# Patient Record
Sex: FEMALE | Race: WHITE | NOT HISPANIC OR LATINO | Employment: OTHER | URBAN - METROPOLITAN AREA
[De-identification: names, ages, dates, MRNs, and addresses within clinical notes are randomized per-mention and may not be internally consistent; named-entity substitution may affect disease eponyms.]

---

## 2017-01-11 RX ORDER — METOPROLOL TARTRATE 50 MG/1
TABLET, FILM COATED ORAL
Qty: 180 TABLET | Refills: 1 | Status: SHIPPED | OUTPATIENT
Start: 2017-01-11 | End: 2017-07-07 | Stop reason: SDUPTHER

## 2017-01-25 ENCOUNTER — OFFICE VISIT (OUTPATIENT)
Dept: CARDIOLOGY | Facility: CLINIC | Age: 77
End: 2017-01-25

## 2017-01-25 VITALS
HEIGHT: 67 IN | HEART RATE: 72 BPM | BODY MASS INDEX: 24.01 KG/M2 | SYSTOLIC BLOOD PRESSURE: 142 MMHG | RESPIRATION RATE: 20 BRPM | DIASTOLIC BLOOD PRESSURE: 74 MMHG | WEIGHT: 153 LBS

## 2017-01-25 DIAGNOSIS — Z98.890 STATUS POST TRICUSPID VALVE REPAIR: ICD-10-CM

## 2017-01-25 DIAGNOSIS — Z98.890 HISTORY OF REPAIR OF MITRAL VALVE: ICD-10-CM

## 2017-01-25 DIAGNOSIS — I25.10 CORONARY ARTERY DISEASE INVOLVING NATIVE CORONARY ARTERY OF NATIVE HEART WITHOUT ANGINA PECTORIS: Primary | ICD-10-CM

## 2017-01-25 PROCEDURE — 99213 OFFICE O/P EST LOW 20 MIN: CPT | Performed by: INTERNAL MEDICINE

## 2017-01-25 RX ORDER — ASPIRIN 325 MG
325 TABLET ORAL DAILY
COMMUNITY

## 2017-01-25 NOTE — MR AVS SNAPSHOT
Yun Han   2017 11:40 AM   Office Visit    Dept Phone:  [phone]   Encounter #:  [id number]    Provider:  Jerzy Burleson MD   Department:  [facility] CARDIOLOGY                Your Full Care Plan              Today's Medication Changes          These changes are accurate as of: 17 12:30 PM.  If you have any questions, ask your nurse or doctor.               Medication(s)that have changed:     aspirin 325 MG tablet   Take 325 mg by mouth Daily.   What changed:  Another medication with the same name was removed. Continue taking this medication, and follow the directions you see here.   Changed by:  Jerzy Burleson MD         Stop taking medication(s)listed here:     PRILOSEC 20 MG capsule   Generic drug:  omeprazole   Stopped by:  Jerzy Burleson MD                      Your Updated Medication List          This list is accurate as of: 17 12:30 PM.  Always use your most recent med list.                aspirin 325 MG tablet       metoprolol tartrate 50 MG tablet   Commonly known as:  LOPRESSOR   TAKE ONE TABLET BY MOUTH TWICE A DAY       Multiple Vitamin tablet       Omega 3 1000 MG capsule       vitamin B-1 50 MG tablet               Instructions     None    Patient Instructions History      Upcoming Appointments     Visit Type Date Time Department    FOLLOW UP 2017 11:40 AM [facility]    FOLLOW UP 2017 11:40 AM [facility]      [employer] Signup     [facility] [employer] allows you to send messages to your doctor, view your test results, renew your prescriptions, schedule appointments, and more. To sign up, go to [employer] and click on the Sign Up Now link in the New User? box. Enter your [employer] Activation Code exactly as it appears below along with the last four digits of your Social Security Number and your Date of Birth () to complete the sign-up process. If you do not sign up  "before the expiration date, you must request a new code.    MyChart Activation Code: [email or web address]  Expires: 2/8/2017 12:30 PM    If you have questions, you can email [email or web address] or call [phone] to talk to our MyChart staff. Remember, MyChart is NOT to be used for urgent needs. For medical emergencies, dial 911.               Other Info from Your Visit           Your Appointments     Jul 24, 2017 11:40 AM EDT   Follow Up with Jerzy Burleson MD   [facility] CARDIOLOGY (--)    [address]  [facility] [address] [phone]           Arrive 15 minutes prior to appointment.              Allergies     Atorvastatin      Codeine      Rosuvastatin  Myalgia    Sulfa Antibiotics        Reason for Visit     Coronary Artery Disease           Vital Signs     Blood Pressure Pulse Respirations Height Weight Body Mass Index    142/74 72 20 67\" (170.2 cm) 153 lb (69.4 kg) 23.96 kg/m2    Smoking Status                   Never Smoker             "

## 2017-01-26 NOTE — PROGRESS NOTES
Date of Office Visit: 2017  Encounter Provider: Jerzy Burleson MD  [facility] of Service[facility]  Patient Name: Yun Han  :1940    Chief complaint: Follow-up for coronary artery disease, mitral valve repair, tricuspid  valve repair, atrial flutter s/p ablation, and atrial tachycardia s/p ablation.     History of Present Illness:    Dear Dr. Elizabeth:      I again had the pleasure of seeing your patient in cardiology office on 2017. As  you well know, she is a very pleasant 76 year-old white female with a past history  significant for coronary artery disease status post coronary artery bypass grafting,  valvular heart disease status post mitral valve and tricuspid valve repairs, and PFO  closure who presents for followup. The patient was originally admitted on 2011 with  a TIA. At that time, she was experiencing left arm paraesthesias, as well as facial  numbness. As part of her workup, she underwent a transesophageal echocardiogram on  2011. 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 There was  also significant valvular heart disease with severe mitral regurgitation and  mild-to-moderate tricuspid regurgitation. She followed up on this in the office for the  mitral regurgitation, and an exercise treadmill stress test was obtained on 2011 to  assess her functional capacity. She did manage to exercise for 5 minutes and 35 seconds,  although she developed severe diffuse ST-T wave changes during the test, diagnostic for  ischemia. She then underwent a right and left heart catheterization on 2011 by Dr. Steve Black. She did have significant coronary artery disease in the LAD and left  circumflex systems. She then underwent a mitral valve repair, tricuspid valve repair, PFO   closure, and three vessel coronary artery bypass grafting operation on 2011 by Dr. Montana Saini at [facility]. She did  develop some postoperative atrial fibrillation,   although this converted with IV amiodarone.      The patient developed tachycardia at one of her rehab sessions on 02/29/2012 at [facility]. Two subsequent EKGs determined this to be atrial flutter. The patient  converted back to normal sinus rhythm on her own. She then was placed back on amio  as well as the Pradaxa. She had a recurrence of atrial flutter on 08/31/2012 and was  admitted at that time. The patient underwent a cavotricuspid isthmus ablation by Dr. Beto Lea at [facility] on 11/08/2012. However, she again presented to her clinical  appointment on 11/26/2012 with recurrent tachycardia into the 120s. She subsequently  underwent a repeat electrophysiology study on 11/27/2012, which showed an ectopic right  atrial tachycardia, which was successfully ablated as well.      The patient presents today for follow-up.  She is doing very well.  She has had no angina   or chest pain.  She has not had any significant shortness of breath.  She has no new  complaints today.    Past Medical History   Diagnosis Date   • Allergic rhinitis    • Atherosclerosis of aorta      Severe plaques in the descending aorta and aortic arch by ROSINA on 10/4/12   • Atrial fibrillation      Post-op after CABG in 12/11    • Atrial flutter      s/p cavotricuspid isthmus ablation on 11/8/12 by Dr. Beto Lea at [facility].     • Bifascicular bundle branch block      RBBB and left posterior hemiblock    • Carotid artery plaque      CTA of head and neck in 9/11 showed moderate calcification at the right carotid bulb causing less than 50% narrowing    • Coronary artery disease      s/p CABG    • Depression with anxiety    • History of atrial flutter      s/p cavotricuspid isthmus ablation on 11/8/12 by Dr Lea at [facility]     • Hypertension    • Mitral valve insufficiency      Moderate residual MR through the repair by ROSINA on 10/4/12. Mild MR by echo on 2/6/14 and  6/30/16.   • PAT (paroxysmal atrial tachycardia)      Ectopic right atrial tachycardia, s/p ablation by Dr. Lea at [facility] on 11/27/12.    • Statin intolerance      Severe myalgias with Crestor, Lipitor, and pravastatin    • Transient cerebral ischemia      Two TIA's in 2011        Past Surgical History   Procedure Laterality Date   • Carpal tunnel release       Neurplasty decompression Median Nerve at Carpal Tunnel    • Patent foramen ovale closure     • Tonsillectomy     • Total abdominal hysterectomy       2000    • Tricuspid valve surgery     • Coronary artery bypass graft       3 Vessel CABG, MV repair, TV repair, and PFO closure on 12/22/2011 by Dr. Saini at [facility]. LIMA to the LAD, SVG-D2, and SVG-OM2.   • Mitral valve repair         Current Outpatient Prescriptions on File Prior to Visit   Medication Sig Dispense Refill   • metoprolol tartrate (LOPRESSOR) 50 MG tablet TAKE ONE TABLET BY MOUTH TWICE A  tablet 1   • Multiple Vitamin tablet Take  by mouth daily.     • Omega 3 1000 MG capsule Take 1 capsule by mouth Daily.     • Thiamine HCl (VITAMIN B-1) 50 MG tablet Take 100 mg by mouth As Needed.     • [DISCONTINUED] aspirin tablet Take  by mouth daily.     • [DISCONTINUED] omeprazole (PRILOSEC) 20 MG capsule Take  by mouth.       No current facility-administered medications on file prior to visit.      Allergies as of 01/25/2017 - Raad as Reviewed 01/25/2017   Allergen Reaction Noted   • Atorvastatin  06/06/2016   • Codeine  06/06/2016   • Rosuvastatin Myalgia 06/06/2016   • Sulfa antibiotics  06/06/2016     Social History     Social History   • Marital status:      Spouse name: N/A   • Number of children: N/A   • Years of education: N/A     Occupational History   • Not on file.     Social History Main Topics   • Smoking status: Never Smoker   • Smokeless tobacco: Never Used   • Alcohol use Yes      Comment: Rarely    • Drug use: No   • Sexual activity: Not on file     Other  "Topics Concern   • Not on file     Social History Narrative     Family History   Problem Relation Age of Onset   • Stroke Mother 87      from CVA    • Coronary artery disease Father 72     Fatal MI at age 72    • Coronary artery disease Sister      CABG and stenting    • Coronary artery disease Brother 71     Fatal MI at age 71       Review of Systems   All other systems reviewed and are negative.    Objective:     Vitals:    17 1151   BP: 142/74   Pulse: 72   Resp: 20   Weight: 153 lb (69.4 kg)   Height: 67\" (170.2 cm)     Body mass index is 23.96 kg/(m^2).    Physical Exam   Constitutional: She is oriented to person, place, and time. She appears well-developed and well-nourished.   HENT:   Head: Normocephalic and atraumatic.   Eyes: Conjunctivae are normal.   Neck: Neck supple.   Cardiovascular: Normal rate and regular rhythm.  Exam reveals no gallop and no friction rub.    Murmur heard.   Systolic murmur is present with a grade of 2/6  at the lower left sternal border  Pulmonary/Chest: Effort normal and breath sounds normal.   Abdominal: Soft. There is no tenderness.   Musculoskeletal: She exhibits no edema.   Neurological: She is alert and oriented to person, place, and time.   Skin: Skin is warm.   Psychiatric: She has a normal mood and affect. Her behavior is normal.     Lab Review:   Procedures    Cardiac Procedures:  1. Status post mitral valve repair with a 24 mm [employer] physio II  annuloplasty ring, tricuspid valve repair with a 26 mm [employer]   MC3 annuloplasty ring, PFO closure and three vessel coronary artery bypass   grafting by Dr. Montana Saini on 2011. Her bypass anatomy included a   LIMA to the LAD, saphenous vein graft to second diagonal, and saphenous vein   graft to second obtuse marginal branch.   2. Transesophageal echocardiogram on 10/04/2012 revealed a normal ejection   fraction of 60-65%. There was moderate residual mitral regurgitation through " the   ring.   3. Status post cavotricuspid isthmus ablation on 11/08/2012 by Dr. Beto Lea   at [facility] for atrial flutter.   4. Status post ectopic right atrial tachycardia ablation by Dr. Beto Lea on   11/27/2012.   5. Echo 06/30/2016: Ejection fraction of 60%, mildly dilated left atrium, mild mitral   regurgitation through the annuloplasty ring, elevated gradients across the mitral   annuloplasty ring with a peak gradient of 15 mmHg and a mean gradient of 7   mmHg, and trace tricuspid regurgitation through the annuloplasty ring.    Assessment:       Diagnosis Plan   1. Coronary artery disease involving native coronary artery of native heart without angina pectoris     2. History of repair of mitral valve     3. Status post tricuspid valve repair       Plan:       The patient seems to be doing excellent at this time.  Her last echocardiogram in June 2016   showed only mild mitral regurgitation.  Her ejection fraction has remained intact.  She will   remain on the aspirin for life given her coronary artery disease.  She will also continue on   the metoprolol at 50 mg twice a day.  She has been intolerant to multiple statin medications,   including Crestor, Lipitor, and pravastatin.  She is not a good candidate for these medications.    I will plan on seeing her back in the office within the next 6 months unless other issues arise.    Coronary Artery Disease  Assessment  • The patient has no angina    Plan  • Lifestyle modifications discussed include adhering to a heart healthy diet, avoidance of tobacco products, maintenance of a healthy weight, medication compliance, regular exercise and regular monitoring of cholesterol and blood pressure    Subjective - Objective  • There is a history of previous coronary artery bypass graft on or around 12/22/2011  • Current antiplatelet therapy includes aspirin 81 mg

## 2017-07-07 RX ORDER — METOPROLOL TARTRATE 50 MG/1
TABLET, FILM COATED ORAL
Qty: 180 TABLET | Refills: 1 | Status: SHIPPED | OUTPATIENT
Start: 2017-07-07 | End: 2018-01-06 | Stop reason: SDUPTHER

## 2017-07-24 ENCOUNTER — OFFICE VISIT (OUTPATIENT)
Dept: CARDIOLOGY | Facility: CLINIC | Age: 77
End: 2017-07-24

## 2017-07-24 VITALS
HEART RATE: 77 BPM | BODY MASS INDEX: 23.86 KG/M2 | OXYGEN SATURATION: 95 % | WEIGHT: 152 LBS | RESPIRATION RATE: 16 BRPM | DIASTOLIC BLOOD PRESSURE: 80 MMHG | SYSTOLIC BLOOD PRESSURE: 140 MMHG | HEIGHT: 67 IN

## 2017-07-24 DIAGNOSIS — Z98.890 STATUS POST TRICUSPID VALVE REPAIR: ICD-10-CM

## 2017-07-24 DIAGNOSIS — I47.1 ATRIAL TACHYCARDIA (HCC): ICD-10-CM

## 2017-07-24 DIAGNOSIS — I25.810 CORONARY ARTERY DISEASE INVOLVING CORONARY BYPASS GRAFT OF NATIVE HEART WITHOUT ANGINA PECTORIS: Primary | ICD-10-CM

## 2017-07-24 DIAGNOSIS — I48.3 TYPICAL ATRIAL FLUTTER (HCC): ICD-10-CM

## 2017-07-24 DIAGNOSIS — Z98.890 HISTORY OF REPAIR OF MITRAL VALVE: ICD-10-CM

## 2017-07-24 PROCEDURE — 99213 OFFICE O/P EST LOW 20 MIN: CPT | Performed by: INTERNAL MEDICINE

## 2017-07-31 NOTE — PROGRESS NOTES
Date of Office Visit: 2017  Encounter Provider: Jerzy Burleson MD  Place of Service: [facility] CARDIOLOGY  Patient Name: Yun Han  :1940    Chief complaint: Follow-up for coronary artery disease, mitral valve repair, tricuspid  valve repair, atrial flutter s/p ablation, and atrial tachycardia s/p ablation.     History of Present Illness:    Dear Dr. Elizabeth:       I again had the pleasure of seeing your patient in cardiology office on 2017. As  you well know, she is a very pleasant 76 year-old white female with a past history  significant for coronary artery disease status post coronary artery bypass grafting,  valvular heart disease status post mitral valve and tricuspid valve repairs, and PFO  closure who presents for followup. The patient was originally admitted on 2011 with  a TIA. At that time, she was experiencing left arm paraesthesias, as well as facial  numbness. As part of her workup, she underwent a transesophageal echocardiogram on  2011. 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 There was  also significant valvular heart disease with severe mitral regurgitation and  mild-to-moderate tricuspid regurgitation. She followed up on this in the office for the  mitral regurgitation, and an exercise treadmill stress test was obtained on 2011 to  assess her functional capacity. She did manage to exercise for 5 minutes and 35 seconds,  although she developed severe diffuse ST-T wave changes during the test, diagnostic for  ischemia. She then underwent a right and left heart catheterization on 2011 by Dr. Steve Black. She did have significant coronary artery disease in the LAD and left  circumflex systems. She then underwent a mitral valve repair, tricuspid valve repair, PFO   closure, and three vessel coronary artery bypass grafting operation on 2011 by Dr. Montana Saini at [facility]. She did  develop some postoperative atrial fibrillation,   although this converted with IV amiodarone.       The patient developed tachycardia at one of her rehab sessions on 02/29/2012 at [facility]. Two subsequent EKGs determined this to be atrial flutter. The patient  converted back to normal sinus rhythm on her own. She then was placed back on amio  as well as the Pradaxa. She had a recurrence of atrial flutter on 08/31/2012 and was  admitted at that time. The patient underwent a cavotricuspid isthmus ablation by Dr. Beto Lea at [facility] on 11/08/2012. However, she again presented to her clinical  appointment on 11/26/2012 with recurrent tachycardia into the 120s. She subsequently  underwent a repeat electrophysiology study on 11/27/2012, which showed an ectopic right  atrial tachycardia, which was successfully ablated as well.       The patient presents today for follow-up.  Overall, she is doing very well.  She has had   no significant chest pain or shortness of breath.  She is taking all her medications   without any difficulty.    Past Medical History:   Diagnosis Date   • Allergic rhinitis    • Atherosclerosis of aorta     Severe plaques in the descending aorta and aortic arch by ROSINA on 10/4/12   • Atrial fibrillation     Post-op after CABG in 12/11    • Atrial flutter     s/p cavotricuspid isthmus ablation on 11/8/12 by Dr. Beto Lea at [facility].     • Bifascicular bundle branch block     RBBB and left posterior hemiblock    • Carotid artery plaque     CTA of head and neck in 9/11 showed moderate calcification at the right carotid bulb causing less than 50% narrowing    • Coronary artery disease     s/p CABG    • Depression with anxiety    • History of atrial flutter     s/p cavotricuspid isthmus ablation on 11/8/12 by Dr Lea at [facility]     • Hypertension    • Mitral valve insufficiency     Moderate residual MR through the repair by ROSINA on 10/4/12. Mild MR by echo on 2/6/14  and 6/30/16.   • MR (mitral regurgitation)    • PAT (paroxysmal atrial tachycardia)     Ectopic right atrial tachycardia, s/p ablation by Dr. Lea at [facility] on 11/27/12.    • Statin intolerance     Severe myalgias with Crestor, Lipitor, and pravastatin    • Transient cerebral ischemia     Two TIA's in 2011        Past Surgical History:   Procedure Laterality Date   • CARPAL TUNNEL RELEASE      Neurplasty decompression Median Nerve at Carpal Tunnel    • CORONARY ARTERY BYPASS GRAFT      3 Vessel CABG, MV repair, TV repair, and PFO closure on 12/22/2011 by Dr. Saini at [facility]. LIMA to the LAD, SVG-D2, and SVG-OM2.   • MITRAL VALVE REPAIR/REPLACEMENT     • PATENT FORAMEN OVALE CLOSURE     • TONSILLECTOMY     • TOTAL ABDOMINAL HYSTERECTOMY      2000    • TRICUSPID VALVE SURGERY         Current Outpatient Prescriptions on File Prior to Visit   Medication Sig Dispense Refill   • aspirin 325 MG tablet Take 325 mg by mouth Daily.     • metoprolol tartrate (LOPRESSOR) 50 MG tablet TAKE ONE TABLET BY MOUTH TWICE A  tablet 1   • Multiple Vitamin tablet Take  by mouth daily.     • Omega 3 1000 MG capsule Take 1 capsule by mouth Daily.     • Thiamine HCl (VITAMIN B-1) 50 MG tablet Take 100 mg by mouth As Needed.       No current facility-administered medications on file prior to visit.      Allergies as of 07/24/2017 - Raad as Reviewed 07/24/2017   Allergen Reaction Noted   • Atorvastatin  06/06/2016   • Codeine  06/06/2016   • Rosuvastatin Myalgia 06/06/2016   • Sulfa antibiotics  06/06/2016     Social History     Social History   • Marital status:      Spouse name: N/A   • Number of children: N/A   • Years of education: N/A     Occupational History   • Not on file.     Social History Main Topics   • Smoking status: Never Smoker   • Smokeless tobacco: Never Used   • Alcohol use Yes      Comment: Rarely    • Drug use: No   • Sexual activity: Not on file     Other Topics Concern   • Not on file  "    Social History Narrative     Family History   Problem Relation Age of Onset   • Stroke Mother 87      from CVA    • Coronary artery disease Father 72     Fatal MI at age 72    • Coronary artery disease Sister      CABG and stenting    • Coronary artery disease Brother 71     Fatal MI at age 71       Review of Systems   Endocrine: Positive for cold intolerance and heat intolerance.   Neurological: Positive for numbness and paresthesias.   All other systems reviewed and are negative.    Objective:     Vitals:    17 1152   BP: 140/80   BP Location: Right arm   Patient Position: Sitting   Cuff Size: Adult   Pulse: 77   Resp: 16   SpO2: 95%   Weight: 152 lb (68.9 kg)   Height: 67\" (170.2 cm)     Body mass index is 23.81 kg/(m^2).    Physical Exam   Constitutional: She is oriented to person, place, and time. She appears well-developed and well-nourished.   HENT:   Head: Normocephalic and atraumatic.   Eyes: Conjunctivae are normal.   Neck: Neck supple.   Cardiovascular: Normal rate and regular rhythm.  Exam reveals no gallop and no friction rub.    Murmur heard.   Systolic murmur is present with a grade of 2/6  at the lower left sternal border  Pulmonary/Chest: Effort normal and breath sounds normal.   Abdominal: Soft. There is no tenderness.   Musculoskeletal: She exhibits no edema.   Neurological: She is alert and oriented to person, place, and time.   Skin: Skin is warm.   Psychiatric: She has a normal mood and affect. Her behavior is normal.     Lab Review:   Procedures    Cardiac Procedures:  1. Status post mitral valve repair with a 24 mm [employer] physio II  annuloplasty ring, tricuspid valve repair with a 26 mm [employer]   MC3 annuloplasty ring, PFO closure and three vessel coronary artery bypass   grafting by Dr. Montana Saini on 2011. Her bypass anatomy included a   LIMA to the LAD, saphenous vein graft to second diagonal, and saphenous vein   graft to second obtuse " marginal branch.   2. Transesophageal echocardiogram on 10/04/2012 revealed a normal ejection   fraction of 60-65%. There was moderate residual mitral regurgitation through the   ring.   3. Status post cavotricuspid isthmus ablation on 11/08/2012 by Dr. Beto Lea   at [facility] for atrial flutter.   4. Status post ectopic right atrial tachycardia ablation by Dr. Beto Lea on   11/27/2012.   5. Echo 06/30/2016: Ejection fraction of 60%, mildly dilated left atrium, mild mitral   regurgitation through the annuloplasty ring, elevated gradients across the mitral   annuloplasty ring with a peak gradient of 15 mmHg and a mean gradient of 7   mmHg, and trace tricuspid regurgitation through the annuloplasty ring.    Assessment:       Diagnosis Plan   1. Coronary artery disease involving coronary bypass graft of native heart without angina pectoris  Adult Transthoracic Echo Complete   2. History of repair of mitral valve  Adult Transthoracic Echo Complete   3. Status post tricuspid valve repair  Adult Transthoracic Echo Complete   4. Typical atrial flutter     5. Atrial tachycardia       Plan:       The patient seems to be stable from a cardiac standpoint at this time.  She will   continue on aspirin for history of coronary artery disease, as well as the Lopressor   twice a day.  She has been intolerant to statin medications in the past, including   Crestor, Lipitor, and pravastatin.  I did ask her if she would be interested in one of   the newer injectable medications.  She wants to think about this, will let me know.    I will have her follow-up with me in 6 months, and I will plan on checking an   echocardiogram around that time to reevaluate the mitral and tricuspid valve   repairs.    Coronary Artery Disease  Assessment  • The patient has no angina   Plan  • Lifestyle modifications discussed include adhering to a heart healthy diet, avoidance of tobacco products, maintenance of a healthy weight, medication  compliance, regular exercise and regular monitoring of cholesterol and blood pressure   Subjective - Objective  • There is a history of previous coronary artery bypass graft on or around 12/22/2011  • Current antiplatelet therapy includes aspirin 81 mg

## 2018-01-08 RX ORDER — METOPROLOL TARTRATE 50 MG/1
TABLET, FILM COATED ORAL
Qty: 180 TABLET | Refills: 3 | Status: SHIPPED | OUTPATIENT
Start: 2018-01-08 | End: 2018-12-26 | Stop reason: SDUPTHER

## 2018-01-22 ENCOUNTER — OFFICE VISIT (OUTPATIENT)
Dept: CARDIOLOGY | Facility: CLINIC | Age: 78
End: 2018-01-22

## 2018-01-22 ENCOUNTER — HOSPITAL ENCOUNTER (OUTPATIENT)
Dept: CARDIOLOGY | Facility: HOSPITAL | Age: 78
Discharge: HOME | End: 2018-01-22
Attending: INTERNAL MEDICINE | Admitting: INTERNAL MEDICINE

## 2018-01-22 VITALS
HEIGHT: 67 IN | WEIGHT: 150 LBS | SYSTOLIC BLOOD PRESSURE: 132 MMHG | HEART RATE: 86 BPM | DIASTOLIC BLOOD PRESSURE: 77 MMHG | BODY MASS INDEX: 23.54 KG/M2

## 2018-01-22 VITALS
SYSTOLIC BLOOD PRESSURE: 144 MMHG | RESPIRATION RATE: 18 BRPM | HEIGHT: 67 IN | DIASTOLIC BLOOD PRESSURE: 72 MMHG | HEART RATE: 72 BPM | BODY MASS INDEX: 24.64 KG/M2 | WEIGHT: 157 LBS

## 2018-01-22 DIAGNOSIS — Z98.890 STATUS POST MITRAL VALVE REPAIR: ICD-10-CM

## 2018-01-22 DIAGNOSIS — I25.810 CORONARY ARTERY DISEASE INVOLVING CORONARY BYPASS GRAFT OF NATIVE HEART WITHOUT ANGINA PECTORIS: ICD-10-CM

## 2018-01-22 DIAGNOSIS — Z98.890 HISTORY OF REPAIR OF MITRAL VALVE: ICD-10-CM

## 2018-01-22 DIAGNOSIS — Z98.890 STATUS POST TRICUSPID VALVE REPAIR: ICD-10-CM

## 2018-01-22 DIAGNOSIS — I47.1 ATRIAL TACHYCARDIA (HCC): ICD-10-CM

## 2018-01-22 DIAGNOSIS — I48.3 TYPICAL ATRIAL FLUTTER (HCC): ICD-10-CM

## 2018-01-22 DIAGNOSIS — I25.810 CORONARY ARTERY DISEASE INVOLVING CORONARY BYPASS GRAFT OF NATIVE HEART WITHOUT ANGINA PECTORIS: Primary | ICD-10-CM

## 2018-01-22 LAB
ASCENDING AORTA: 3 CM
BH CV ECHO MEAS - ACS: 1.3 CM
BH CV ECHO MEAS - AO MAX PG (FULL): 4 MMHG
BH CV ECHO MEAS - AO MAX PG: 6.9 MMHG
BH CV ECHO MEAS - AO MEAN PG (FULL): 2 MMHG
BH CV ECHO MEAS - AO MEAN PG: 3.6 MMHG
BH CV ECHO MEAS - AO ROOT AREA (BSA CORRECTED): 1.5
BH CV ECHO MEAS - AO ROOT AREA: 5.7 CM^2
BH CV ECHO MEAS - AO ROOT DIAM: 2.7 CM
BH CV ECHO MEAS - AO V2 MAX: 131.7 CM/SEC
BH CV ECHO MEAS - AO V2 MEAN: 91.2 CM/SEC
BH CV ECHO MEAS - AO V2 VTI: 28.2 CM
BH CV ECHO MEAS - AVA(I,A): 1.5 CM^2
BH CV ECHO MEAS - AVA(I,D): 1.5 CM^2
BH CV ECHO MEAS - AVA(V,A): 1.4 CM^2
BH CV ECHO MEAS - AVA(V,D): 1.4 CM^2
BH CV ECHO MEAS - BSA(HAYCOCK): 1.8 M^2
BH CV ECHO MEAS - BSA: 1.8 M^2
BH CV ECHO MEAS - BZI_BMI: 23.5 KILOGRAMS/M^2
BH CV ECHO MEAS - BZI_METRIC_HEIGHT: 170.2 CM
BH CV ECHO MEAS - BZI_METRIC_WEIGHT: 68 KG
BH CV ECHO MEAS - CONTRAST EF (2CH): 63.2 ML/M^2
BH CV ECHO MEAS - CONTRAST EF 4CH: 61.9 ML/M^2
BH CV ECHO MEAS - EDV(MOD-SP2): 68 ML
BH CV ECHO MEAS - EDV(MOD-SP4): 63 ML
BH CV ECHO MEAS - EDV(TEICH): 101.3 ML
BH CV ECHO MEAS - EF(CUBED): 62.4 %
BH CV ECHO MEAS - EF(MOD-SP2): 63.2 %
BH CV ECHO MEAS - EF(MOD-SP4): 61.9 %
BH CV ECHO MEAS - EF(TEICH): 53.9 %
BH CV ECHO MEAS - ESV(MOD-SP2): 25 ML
BH CV ECHO MEAS - ESV(MOD-SP4): 24 ML
BH CV ECHO MEAS - ESV(TEICH): 46.7 ML
BH CV ECHO MEAS - FS: 27.8 %
BH CV ECHO MEAS - IVS/LVPW: 0.96
BH CV ECHO MEAS - IVSD: 1 CM
BH CV ECHO MEAS - LAT PEAK E' VEL: 5 CM/SEC
BH CV ECHO MEAS - LV DIASTOLIC VOL/BSA (35-75): 35.2 ML/M^2
BH CV ECHO MEAS - LV MASS(C)D: 171.7 GRAMS
BH CV ECHO MEAS - LV MASS(C)DI: 95.9 GRAMS/M^2
BH CV ECHO MEAS - LV MAX PG: 3 MMHG
BH CV ECHO MEAS - LV MEAN PG: 1.7 MMHG
BH CV ECHO MEAS - LV SYSTOLIC VOL/BSA (12-30): 13.4 ML/M^2
BH CV ECHO MEAS - LV V1 MAX: 86.3 CM/SEC
BH CV ECHO MEAS - LV V1 MEAN: 60.1 CM/SEC
BH CV ECHO MEAS - LV V1 VTI: 19.7 CM
BH CV ECHO MEAS - LVIDD: 4.7 CM
BH CV ECHO MEAS - LVIDS: 3.4 CM
BH CV ECHO MEAS - LVLD AP2: 6.9 CM
BH CV ECHO MEAS - LVLD AP4: 7.1 CM
BH CV ECHO MEAS - LVLS AP2: 6.2 CM
BH CV ECHO MEAS - LVLS AP4: 6.3 CM
BH CV ECHO MEAS - LVOT AREA (M): 2.3 CM^2
BH CV ECHO MEAS - LVOT AREA: 2.2 CM^2
BH CV ECHO MEAS - LVOT DIAM: 1.7 CM
BH CV ECHO MEAS - LVPWD: 1.1 CM
BH CV ECHO MEAS - MED PEAK E' VEL: 5 CM/SEC
BH CV ECHO MEAS - MR MAX PG: 57 MMHG
BH CV ECHO MEAS - MR MAX VEL: 377.3 CM/SEC
BH CV ECHO MEAS - MV A DUR: 0.14 SEC
BH CV ECHO MEAS - MV A MAX VEL: 127.3 CM/SEC
BH CV ECHO MEAS - MV DEC SLOPE: 808.5 CM/SEC^2
BH CV ECHO MEAS - MV DEC TIME: 0.16 SEC
BH CV ECHO MEAS - MV E MAX VEL: 146 CM/SEC
BH CV ECHO MEAS - MV E/A: 1.1
BH CV ECHO MEAS - MV MAX PG: 17.7 MMHG
BH CV ECHO MEAS - MV MEAN PG: 10.2 MMHG
BH CV ECHO MEAS - MV P1/2T MAX VEL: 209 CM/SEC
BH CV ECHO MEAS - MV P1/2T: 75.7 MSEC
BH CV ECHO MEAS - MV V2 MAX: 210.4 CM/SEC
BH CV ECHO MEAS - MV V2 MEAN: 153 CM/SEC
BH CV ECHO MEAS - MV V2 VTI: 50.6 CM
BH CV ECHO MEAS - MVA P1/2T LCG: 1.1 CM^2
BH CV ECHO MEAS - MVA(P1/2T): 2.9 CM^2
BH CV ECHO MEAS - MVA(VTI): 0.84 CM^2
BH CV ECHO MEAS - PA ACC TIME: 0.13 SEC
BH CV ECHO MEAS - PA MAX PG (FULL): 1.8 MMHG
BH CV ECHO MEAS - PA MAX PG: 2.8 MMHG
BH CV ECHO MEAS - PA PR(ACCEL): 18.8 MMHG
BH CV ECHO MEAS - PA V2 MAX: 84.2 CM/SEC
BH CV ECHO MEAS - PULM DIAS VEL: 49.4 CM/SEC
BH CV ECHO MEAS - PULM S/D: 0.86
BH CV ECHO MEAS - PULM SYS VEL: 42.7 CM/SEC
BH CV ECHO MEAS - RAP SYSTOLE: 8 MMHG
BH CV ECHO MEAS - RV MAX PG: 1 MMHG
BH CV ECHO MEAS - RV MEAN PG: 0.44 MMHG
BH CV ECHO MEAS - RV V1 MAX: 50 CM/SEC
BH CV ECHO MEAS - RV V1 MEAN: 31.1 CM/SEC
BH CV ECHO MEAS - RV V1 VTI: 9.6 CM
BH CV ECHO MEAS - RVSP: 20 MMHG
BH CV ECHO MEAS - SI(AO): 90 ML/M^2
BH CV ECHO MEAS - SI(CUBED): 35.7 ML/M^2
BH CV ECHO MEAS - SI(LVOT): 23.8 ML/M^2
BH CV ECHO MEAS - SI(MOD-SP2): 24 ML/M^2
BH CV ECHO MEAS - SI(MOD-SP4): 21.8 ML/M^2
BH CV ECHO MEAS - SI(TEICH): 30.5 ML/M^2
BH CV ECHO MEAS - SUP REN AO DIAM: 1.8 CM
BH CV ECHO MEAS - SV(AO): 161.1 ML
BH CV ECHO MEAS - SV(CUBED): 63.9 ML
BH CV ECHO MEAS - SV(LVOT): 42.7 ML
BH CV ECHO MEAS - SV(MOD-SP2): 43 ML
BH CV ECHO MEAS - SV(MOD-SP4): 39 ML
BH CV ECHO MEAS - SV(TEICH): 54.6 ML
BH CV ECHO MEAS - TAPSE (>1.6): 1.4 CM2
BH CV ECHO MEAS - TR MAX VEL: 173.7 CM/SEC
BH CV XLRA - RV BASE: 2.6 CM
BH CV XLRA - TDI S': 9 CM/SEC
E/E' RATIO: 30.5
LEFT ATRIUM VOLUME INDEX: 18 ML/M2
MAXIMAL PREDICTED HEART RATE: 141 BPM
SINUS: 2.3 CM
STJ: 2.5 CM
STRESS TARGET HR: 120 BPM
TV MEAN GRADIENT: 3 MMHG
TV PEAK GRADIENT: 6 MMHG

## 2018-01-22 PROCEDURE — 99213 OFFICE O/P EST LOW 20 MIN: CPT | Performed by: INTERNAL MEDICINE

## 2018-01-22 PROCEDURE — 93306 TTE W/DOPPLER COMPLETE: CPT | Performed by: INTERNAL MEDICINE

## 2018-01-22 PROCEDURE — 93306 TTE W/DOPPLER COMPLETE: CPT

## 2018-01-22 NOTE — PROGRESS NOTES
Date of Office Visit: 2018  Encounter Provider: Jerzy Burleson MD  Place of Service: [facility] CARDIOLOGY  Patient Name: Yun Han  :1940    Chief complaint: Follow-up for coronary artery disease, mitral valve repair, tricuspid  valve repair, atrial flutter s/p ablation, and atrial tachycardia s/p ablation.     History of Present Illness:    Dear Dr. Elizabeth:       I again had the pleasure of seeing your patient in cardiology office on 2018. As  you well know, she is a very pleasant 77 year-old white female with a past history  significant for coronary artery disease status post coronary artery bypass grafting,  valvular heart disease status post mitral valve and tricuspid valve repairs, and PFO  closure who presents for followup. The patient was originally admitted on 2011 with  a TIA. At that time, she was experiencing left arm paraesthesias, as well as facial  numbness. As part of her workup, she underwent a transesophageal echocardiogram on  2011. 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 There was  also significant valvular heart disease with severe mitral regurgitation and  mild-to-moderate tricuspid regurgitation. She followed up on this in the office for the  mitral regurgitation, and an exercise treadmill stress test was obtained on 2011 to  assess her functional capacity. She did manage to exercise for 5 minutes and 35 seconds,  although she developed severe diffuse ST-T wave changes during the test, diagnostic for  ischemia. She then underwent a right and left heart catheterization on 2011 by Dr. Steve Black. She did have significant coronary artery disease in the LAD and left  circumflex systems. She then underwent a mitral valve repair, tricuspid valve repair, PFO   closure, and three vessel coronary artery bypass grafting operation on 2011 by Dr. Montana Saini at [facility]. She did  develop some postoperative atrial fibrillation,   although this converted with IV amiodarone.       The patient developed tachycardia at one of her rehab sessions on 02/29/2012 at [facility]. Two subsequent EKGs determined this to be atrial flutter. The patient  converted back to normal sinus rhythm on her own. She then was placed back on amio  as well as the Pradaxa. She had a recurrence of atrial flutter on 08/31/2012 and was  admitted at that time. The patient underwent a cavotricuspid isthmus ablation by Dr. Beto Lea at [facility] on 11/08/2012. However, she again presented to her clinical  appointment on 11/26/2012 with recurrent tachycardia into the 120s. She subsequently  underwent a repeat electrophysiology study on 11/27/2012, which showed an ectopic right  atrial tachycardia, which was successfully ablated as well.       The patient presents today for follow-up.  Overall, she is doing well.  She is had no chest   pain, shortness of breath, or other symptoms.  She did have an echocardiogram today   which showed elevated gradients across the mitral valve repair, but otherwise looked   good.    Past Medical History:   Diagnosis Date   • Allergic rhinitis    • Atherosclerosis of aorta     Severe plaques in the descending aorta and aortic arch by ROSINA on 10/4/12   • Atrial fibrillation     Post-op after CABG in 12/11    • Atrial flutter     s/p cavotricuspid isthmus ablation on 11/8/12 by Dr. Beto Lea at [facility].     • Bifascicular bundle branch block     RBBB and left posterior hemiblock    • Carotid artery plaque     CTA of head and neck in 9/11 showed moderate calcification at the right carotid bulb causing less than 50% narrowing    • Coronary artery disease     s/p CABG    • Depression with anxiety    • History of atrial flutter     s/p cavotricuspid isthmus ablation on 11/8/12 by Dr Lea at [facility]     • Hypertension    • Mitral valve insufficiency     Moderate  residual MR through the repair by ROSINA on 10/4/12. Mild MR by echo on 2/6/14 and 6/30/16.   • MR (mitral regurgitation)    • PAT (paroxysmal atrial tachycardia)     Ectopic right atrial tachycardia, s/p ablation by Dr. Lea at [facility] on 11/27/12.    • Statin intolerance     Severe myalgias with Crestor, Lipitor, and pravastatin    • Transient cerebral ischemia     Two TIA's in 2011        Past Surgical History:   Procedure Laterality Date   • CARPAL TUNNEL RELEASE      Neurplasty decompression Median Nerve at Carpal Tunnel    • CORONARY ARTERY BYPASS GRAFT      3 Vessel CABG, MV repair, TV repair, and PFO closure on 12/22/2011 by Dr. Saini at [facility]. LIMA to the LAD, SVG-D2, and SVG-OM2.   • MITRAL VALVE REPAIR/REPLACEMENT     • PATENT FORAMEN OVALE CLOSURE     • TONSILLECTOMY     • TOTAL ABDOMINAL HYSTERECTOMY      2000    • TRICUSPID VALVE SURGERY         Current Outpatient Prescriptions on File Prior to Visit   Medication Sig Dispense Refill   • aspirin 325 MG tablet Take 325 mg by mouth Daily.     • metoprolol tartrate (LOPRESSOR) 50 MG tablet TAKE ONE TABLET BY MOUTH TWICE A  tablet 3   • Multiple Vitamin tablet Take  by mouth daily.     • Omega 3 1000 MG capsule Take 1 capsule by mouth Daily.     • [DISCONTINUED] Thiamine HCl (VITAMIN B-1) 50 MG tablet Take 100 mg by mouth As Needed.       No current facility-administered medications on file prior to visit.      Allergies as of 01/22/2018 - Raad as Reviewed 01/22/2018   Allergen Reaction Noted   • Atorvastatin  06/06/2016   • Codeine  06/06/2016   • Rosuvastatin Myalgia 06/06/2016   • Sulfa antibiotics  06/06/2016     Social History     Social History   • Marital status:      Spouse name: N/A   • Number of children: N/A   • Years of education: N/A     Occupational History   • Not on file.     Social History Main Topics   • Smoking status: Never Smoker   • Smokeless tobacco: Never Used   • Alcohol use Yes      Comment: Rarely   "  • Drug use: No   • Sexual activity: Not on file     Other Topics Concern   • Not on file     Social History Narrative     Family History   Problem Relation Age of Onset   • Stroke Mother 87      from CVA    • Coronary artery disease Father 72     Fatal MI at age 72    • Coronary artery disease Sister      CABG and stenting    • Coronary artery disease Brother 71     Fatal MI at age 71       Review of Systems   Neurological: Positive for light-headedness.   All other systems reviewed and are negative.    Objective:     Vitals:    18 1206   BP: 144/72   Pulse: 72   Resp: 18   Weight: 71.2 kg (157 lb)   Height: 170.2 cm (67\")     Body mass index is 24.59 kg/(m^2).    Physical Exam   Constitutional: She is oriented to person, place, and time. She appears well-developed and well-nourished.   HENT:   Head: Normocephalic and atraumatic.   Eyes: Conjunctivae are normal.   Neck: Neck supple.   Cardiovascular: Normal rate and regular rhythm.  Exam reveals no gallop and no friction rub.    Murmur heard.   Systolic murmur is present with a grade of 2/6  at the lower left sternal border  Pulmonary/Chest: Effort normal and breath sounds normal.   Abdominal: Soft. There is no tenderness.   Musculoskeletal: She exhibits no edema.   Neurological: She is alert and oriented to person, place, and time.   Skin: Skin is warm.   Psychiatric: She has a normal mood and affect. Her behavior is normal.     Lab Review:   Procedures    Cardiac Procedures:  1. Status post mitral valve repair with a 24 mm [employer] physio II  annuloplasty ring, tricuspid valve repair with a 26 mm [employer] MC3   annuloplasty ring, PFO closure and three vessel coronary artery bypass grafting by   Dr. Montana Saini on 2011. Her bypass anatomy included a LIMA to the LAD,   saphenous vein graft to second diagonal, and saphenous vein graft to second   obtuse marginal branch.   2. Transesophageal echocardiogram on 10/04/2012 " revealed a normal ejection   fraction of 60-65%. There was moderate residual mitral regurgitation through the   ring.   3. Status post cavotricuspid isthmus ablation on 11/08/2012 by Dr. Beto Lea at   [facility] for atrial flutter.   4. Status post ectopic right atrial tachycardia ablation by Dr. Beto Lea on   11/27/2012.   5. Echo 06/30/2016: Ejection fraction of 60%, mildly dilated left atrium, mild mitral   regurgitation through the annuloplasty ring, elevated gradients across the mitral   annuloplasty ring with a peak gradient of 15 mmHg and a mean gradient of 7   mmHg, and trace tricuspid regurgitation through the annuloplasty ring.  6. Echocardiogram on 1/22/2018: The ejection fraction was 62%.  There was mild   LVH.  The left atrium was mildly dilated.  There was mild regurgitation through the   mitral annuloplasty ring.  Gradients across mitral valve annuloplasty were elevated   with a peak of 18 mmHg, and a mean of 10 mmHg.  There was trace tricuspid   regurgitation through the annuloplasty ring.    Assessment:       Diagnosis Plan   1. Coronary artery disease involving coronary bypass graft of native heart without angina pectoris     2. Status post mitral valve repair     3. Status post tricuspid valve repair     4. Typical atrial flutter     5. Atrial tachycardia       Plan:       The patient seems to be doing very well at this point.  The gradients across her mitral   annuloplasty were again elevated, although she is asymptomatic.  She had only mild mitral   regurgitation.  Her ejection fraction is good at 62%.  She has had no chest pain or anginal   symptoms.  She will continue on the aspirin and metoprolol.  Her blood pressure is elevated,   and I have asked her to watch this and let me know if it remains elevated at home.  She is   highly intolerant to multiple statins.  I am going to obtain her most recent lipid panel from   November 2017.  I have discussed the possibility of one of the newer  injectable agents.    She is still unsure about this, although I would like to look at her cholesterol before   discussing this further with her.  For now, I will plan on seeing her back in the office in 6   months.    Coronary Artery Disease  Assessment  • The patient has no angina    Plan  • Lifestyle modifications discussed include adhering to a heart healthy diet, avoidance of tobacco products, maintenance of a healthy weight, medication compliance, regular exercise and regular monitoring of cholesterol and blood pressure    Subjective - Objective  • There is a history of previous coronary artery bypass graft on or around 12/22/2011  • Current antiplatelet therapy includes aspirin 81 mg    Atrial Fibrillation and Atrial Flutter  Assessment  • The patient has paroxysmal atrial flutter  • This is non-valvular in etiology  • Atrial flutter ablation on 11/8/2012 - no recurrence afterwards.

## 2018-04-16 ENCOUNTER — TELEPHONE (OUTPATIENT)
Dept: CARDIOLOGY | Facility: CLINIC | Age: 78
End: 2018-04-16

## 2018-04-16 DIAGNOSIS — R00.2 PALPITATIONS: Primary | ICD-10-CM

## 2018-04-16 NOTE — TELEPHONE ENCOUNTER
Spoke with Shabana she is aware about holter monitor.   She will be waiting a call from schedulers.   Napoleon NORWOOD

## 2018-04-16 NOTE — TELEPHONE ENCOUNTER
Yesenia,  Will you please scheduled pt ASAP per Dr TANNER for Holter monitor.   He has placed order.  Please call Shabana her Daughter [phone]  Thanks Napoleon NORWOOD

## 2018-04-16 NOTE — TELEPHONE ENCOUNTER
Napoleon,    I ordered a Holter on her.  Can you please ask scheduling to get this on ASAP, and let the patient know?  Thanks     GM

## 2018-04-16 NOTE — TELEPHONE ENCOUNTER
04/16/18   Shabana called stating her mother's stating her heart rate feels different since last Thursday.   She is taking her Metoprolol. Her HR went from 70's to up 90's.   She thought she might have skipped a pill but it has continued.   Please advise   Shabana call back # [phone]   Thanks Napoleon NORWOOD

## 2018-07-25 ENCOUNTER — OFFICE VISIT (OUTPATIENT)
Dept: CARDIOLOGY | Facility: CLINIC | Age: 78
End: 2018-07-25

## 2018-07-25 VITALS
WEIGHT: 154 LBS | BODY MASS INDEX: 24.17 KG/M2 | HEART RATE: 72 BPM | HEIGHT: 67 IN | SYSTOLIC BLOOD PRESSURE: 132 MMHG | DIASTOLIC BLOOD PRESSURE: 70 MMHG | RESPIRATION RATE: 18 BRPM

## 2018-07-25 DIAGNOSIS — Z98.890 HISTORY OF TRICUSPID VALVE REPAIR: ICD-10-CM

## 2018-07-25 DIAGNOSIS — Z98.890 STATUS POST CATHETER ABLATION OF ATRIAL FLUTTER: ICD-10-CM

## 2018-07-25 DIAGNOSIS — I25.810 CORONARY ARTERY DISEASE INVOLVING CORONARY BYPASS GRAFT OF NATIVE HEART WITHOUT ANGINA PECTORIS: Primary | ICD-10-CM

## 2018-07-25 DIAGNOSIS — Z98.890 STATUS POST MITRAL VALVE REPAIR: ICD-10-CM

## 2018-07-25 DIAGNOSIS — I47.1 ATRIAL TACHYCARDIA (HCC): ICD-10-CM

## 2018-07-25 PROCEDURE — 99213 OFFICE O/P EST LOW 20 MIN: CPT | Performed by: INTERNAL MEDICINE

## 2018-08-12 NOTE — PROGRESS NOTES
Date of Office Visit: 2018  Encounter Provider: Jerzy Burleson MD  Place of Service: [facility] CARDIOLOGY  Patient Name: Yun Han  :1940    Chief complaint: Follow-up for coronary artery disease, mitral valve repair, tricuspid  valve repair, atrial flutter s/p ablation, and atrial tachycardia s/p ablation.     History of Present Illness:    Dear Dr. Elizabeth:       I again had the pleasure of seeing your patient in cardiology office on 2018. As  you well know, she is a very pleasant 77 year-old white female with a past history  significant for coronary artery disease status post coronary artery bypass grafting,  valvular heart disease status post mitral valve and tricuspid valve repairs, and PFO  closure who presents for followup. The patient was originally admitted on 2011 with  a TIA. At that time, she was experiencing left arm paraesthesias, as well as facial  numbness. As part of her workup, she underwent a transesophageal echocardiogram on  2011. 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 There was  also significant valvular heart disease with severe mitral regurgitation and  mild-to-moderate tricuspid regurgitation. She followed up on this in the office for the  mitral regurgitation, and an exercise treadmill stress test was obtained on 2011 to  assess her functional capacity. She did manage to exercise for 5 minutes and 35 seconds,  although she developed severe diffuse ST-T wave changes during the test, diagnostic for  ischemia. She then underwent a right and left heart catheterization on 2011 by Dr. Steve Black. She did have significant coronary artery disease in the LAD and left  circumflex systems. She then underwent a mitral valve repair, tricuspid valve repair, PFO   closure, and three vessel coronary artery bypass grafting operation on 2011 by Dr. Montana Saini at [facility]. She did  develop some postoperative atrial fibrillation,   although this converted with IV amiodarone.       The patient developed tachycardia at one of her rehab sessions on 02/29/2012 at [facility]. Two subsequent EKGs determined this to be atrial flutter. The patient  converted back to normal sinus rhythm on her own. She then was placed back on amio  as well as the Pradaxa. She had a recurrence of atrial flutter on 08/31/2012 and was  admitted at that time. The patient underwent a cavotricuspid isthmus ablation by Dr. Beto Lea at [facility] on 11/08/2012. However, she again presented to her clinical  appointment on 11/26/2012 with recurrent tachycardia into the 120s. She subsequently  underwent a repeat electrophysiology study on 11/27/2012, which showed an ectopic right  atrial tachycardia, which was successfully ablated as well.       The patient presents today for follow-up.  She is still at her baseline.  She has had no   increasing shortness of breath or chest discomfort.  She states that her blood pressure   has been good at home.  Overall, she has had no new changes.    Past Medical History:   Diagnosis Date   • Allergic rhinitis    • Atherosclerosis of aorta (CMS/HCC)     Severe plaques in the descending aorta and aortic arch by ROSINA on 10/4/12   • Atrial fibrillation (CMS/HCC)     Post-op after CABG in 12/11    • Atrial flutter (CMS/HCC)     s/p cavotricuspid isthmus ablation on 11/8/12 by Dr. Beto Lea at [facility].     • Bifascicular bundle branch block     RBBB and left posterior hemiblock    • Carotid artery plaque     CTA of head and neck in 9/11 showed moderate calcification at the right carotid bulb causing less than 50% narrowing    • Coronary artery disease     s/p CABG    • Depression with anxiety    • History of atrial flutter     s/p cavotricuspid isthmus ablation on 11/8/12 by Dr Lea at [facility]     • Hypertension    • Mitral valve insufficiency     Moderate residual  MR through the repair by ROSINA on 10/4/12. Mild MR by echo on 2/6/14 and 6/30/16.   • MR (mitral regurgitation)    • PAT (paroxysmal atrial tachycardia) (CMS/HCC)     Ectopic right atrial tachycardia, s/p ablation by Dr. Lea at [facility] on 11/27/12.    • Statin intolerance     Severe myalgias with Crestor, Lipitor, and pravastatin    • Transient cerebral ischemia     Two TIA's in 2011        Past Surgical History:   Procedure Laterality Date   • CARPAL TUNNEL RELEASE      Neurplasty decompression Median Nerve at Carpal Tunnel    • CORONARY ARTERY BYPASS GRAFT      3 Vessel CABG, MV repair, TV repair, and PFO closure on 12/22/2011 by Dr. Saini at [facility]. LIMA to the LAD, SVG-D2, and SVG-OM2.   • MITRAL VALVE REPAIR/REPLACEMENT     • PATENT FORAMEN OVALE CLOSURE     • TONSILLECTOMY     • TOTAL ABDOMINAL HYSTERECTOMY      2000    • TRICUSPID VALVE SURGERY         Current Outpatient Prescriptions on File Prior to Visit   Medication Sig Dispense Refill   • aspirin 325 MG tablet Take 325 mg by mouth Daily.     • Cyanocobalamin (VITAMIN B12 PO) Take  by mouth Daily.     • metoprolol tartrate (LOPRESSOR) 50 MG tablet TAKE ONE TABLET BY MOUTH TWICE A  tablet 3   • Multiple Vitamin tablet Take  by mouth daily.     • Omega 3 1000 MG capsule Take 1 capsule by mouth Daily.       No current facility-administered medications on file prior to visit.      Allergies as of 07/25/2018 - Reviewed 07/25/2018   Allergen Reaction Noted   • Atorvastatin  06/06/2016   • Codeine  06/06/2016   • Rosuvastatin Myalgia 06/06/2016   • Sulfa antibiotics  06/06/2016     Social History     Social History   • Marital status:      Spouse name: N/A   • Number of children: N/A   • Years of education: N/A     Occupational History   • Not on file.     Social History Main Topics   • Smoking status: Never Smoker   • Smokeless tobacco: Never Used   • Alcohol use Yes      Comment: Rarely    • Drug use: No   • Sexual activity: Not  "on file     Other Topics Concern   • Not on file     Social History Narrative   • No narrative on file     Family History   Problem Relation Age of Onset   • Stroke Mother 87         from CVA    • Coronary artery disease Father 72        Fatal MI at age 72    • Coronary artery disease Sister         CABG and stenting    • Coronary artery disease Brother 71        Fatal MI at age 71       Review of Systems   Constitution: Positive for malaise/fatigue.   Neurological: Positive for light-headedness.   All other systems reviewed and are negative.     Objective:     Vitals:    18 1142   BP: 132/70   Pulse: 72   Resp: 18   Weight: 69.9 kg (154 lb)   Height: 170.2 cm (67\")     Body mass index is 24.12 kg/m².    Physical Exam   Constitutional: She is oriented to person, place, and time. She appears well-developed and well-nourished.   HENT:   Head: Normocephalic and atraumatic.   Eyes: Conjunctivae are normal.   Neck: Neck supple.   Cardiovascular: Normal rate and regular rhythm.  Exam reveals no gallop and no friction rub.    Murmur heard.   Systolic murmur is present with a grade of 2/6  at the lower left sternal border  Pulmonary/Chest: Effort normal and breath sounds normal.   Abdominal: Soft. There is no tenderness.   Musculoskeletal: She exhibits no edema.   Neurological: She is alert and oriented to person, place, and time.   Skin: Skin is warm.   Psychiatric: She has a normal mood and affect. Her behavior is normal.     Lab Review:   Procedures    Cardiac Procedures:  1. Status post mitral valve repair with a 24 mm [employer] physio II  annuloplasty ring, tricuspid valve repair with a 26 mm [employer] MC3   annuloplasty ring, PFO closure and three vessel coronary artery bypass grafting by   Dr. Montana Saini on 2011. Her bypass anatomy included a LIMA to the LAD,   saphenous vein graft to second diagonal, and saphenous vein graft to second   obtuse marginal branch.   2. " Transesophageal echocardiogram on 10/04/2012 revealed a normal ejection   fraction of 60-65%. There was moderate residual mitral regurgitation through the   ring.   3. Status post cavotricuspid isthmus ablation on 11/08/2012 by Dr. Beto Lea at   [facility] for atrial flutter.   4. Status post ectopic right atrial tachycardia ablation by Dr. Beto Lea on   11/27/2012.   5. Echo 06/30/2016: Ejection fraction of 60%, mildly dilated left atrium, mild mitral   regurgitation through the annuloplasty ring, elevated gradients across the mitral   annuloplasty ring with a peak gradient of 15 mmHg and a mean gradient of 7   mmHg, and trace tricuspid regurgitation through the annuloplasty ring.  6. Echocardiogram on 1/22/2018: The ejection fraction was 62%.  There was mild   LVH.  The left atrium was mildly dilated.  There was mild regurgitation through the   mitral annuloplasty ring.  Gradients across mitral valve annuloplasty were elevated   with a peak of 18 mmHg, and a mean of 10 mmHg.  There was trace tricuspid   regurgitation through the annuloplasty ring.    Assessment:       Diagnosis Plan   1. Coronary artery disease involving coronary bypass graft of native heart without angina pectoris     2. Status post mitral valve repair     3. History of tricuspid valve repair     4. Status post catheter ablation of atrial flutter     5. Atrial tachycardia (CMS/HCC)       Plan:       The patient seems to be doing very well.  She has had no new shortness of breath.  The   gradients across her mitral valve annuloplasty were elevated on her last echo, with a mean   gradient of 10 mmHg.  However, she is asymptomatic.  She will continue on aspirin and   metoprolol.  She is statin intolerant to multiple statin medications.  Her cholesterol is   actually too low to qualify for Repatha or Praluent.  She told me that her blood pressure   has been good.  For now, I did not change any of her medications.  I will likely recheck an    echocardiogram to reevaluate the mitral valve gradient across the annuloplasty ring again   next year.  I will have her come back in 6 months.    Coronary Artery Disease  Assessment  • The patient has no angina    Plan  • Lifestyle modifications discussed include adhering to a heart healthy diet, avoidance of tobacco products, maintenance of a healthy weight, medication compliance, regular exercise and regular monitoring of cholesterol and blood pressure    Subjective - Objective  • There is a history of previous coronary artery bypass graft on or around 12/22/2011  • Current antiplatelet therapy includes aspirin 81 mg      Atrial Fibrillation and Atrial Flutter  Assessment  • The patient has paroxysmal atrial flutter  • This is non-valvular in etiology  • Atrial flutter ablation on 11/8/2012.  There has been no recurrence afterwards.

## 2018-12-26 RX ORDER — METOPROLOL TARTRATE 50 MG/1
TABLET, FILM COATED ORAL
Qty: 180 TABLET | Refills: 2 | Status: SHIPPED | OUTPATIENT
Start: 2018-12-26 | End: 2019-10-03 | Stop reason: SDUPTHER

## 2019-01-25 ENCOUNTER — OFFICE VISIT (OUTPATIENT)
Dept: CARDIOLOGY | Facility: CLINIC | Age: 79
End: 2019-01-25

## 2019-01-25 VITALS
DIASTOLIC BLOOD PRESSURE: 78 MMHG | OXYGEN SATURATION: 98 % | SYSTOLIC BLOOD PRESSURE: 130 MMHG | HEIGHT: 67 IN | HEART RATE: 77 BPM | WEIGHT: 155 LBS | BODY MASS INDEX: 24.33 KG/M2

## 2019-01-25 DIAGNOSIS — Z98.890 S/P MITRAL VALVE REPAIR: ICD-10-CM

## 2019-01-25 DIAGNOSIS — Z98.890 HX OF TRICUSPID VALVE REPAIR: ICD-10-CM

## 2019-01-25 DIAGNOSIS — I48.3 TYPICAL ATRIAL FLUTTER (HCC): ICD-10-CM

## 2019-01-25 DIAGNOSIS — I25.10 ATHEROSCLEROSIS OF CORONARY ARTERY OF NATIVE HEART WITHOUT ANGINA PECTORIS, UNSPECIFIED VESSEL OR LESION TYPE: Primary | ICD-10-CM

## 2019-01-25 PROBLEM — I34.0 MITRAL VALVE INSUFFICIENCY: Status: ACTIVE | Noted: 2019-01-25

## 2019-01-25 PROCEDURE — 99214 OFFICE O/P EST MOD 30 MIN: CPT | Performed by: NURSE PRACTITIONER

## 2019-01-25 PROCEDURE — 93000 ELECTROCARDIOGRAM COMPLETE: CPT | Performed by: NURSE PRACTITIONER

## 2019-01-25 NOTE — PROGRESS NOTES
"    Patient Name: Yun Han  :1940  Age: 78 y.o.  Primary Cardiologist: Beto Burleson MD  Encounter Provider:  EVERARDO Maxwell      Chief Complaint:   Chief Complaint   Patient presents with   • Follow-up     6 month         HPI  Yun Han is a 78 y.o. female with a history significant for atrial flutter, coronary artery disease, history of tricuspid valve repair and history of mitral valve repair.  Patient states that she has done well over the past 6 months.  She reports occasional episodes of lightheadedness that she notes a related to position changes.  Patient denies any chest pain, shortness of breath, heart palpitations, swelling, fatigue.    The following portions of the patient's history were reviewed and updated as appropriate: allergies, current medications, past family history, past medical history, past social history, past surgical history and problem list.    Current Outpatient Medications on File Prior to Visit   Medication Sig   • aspirin 325 MG tablet Take 325 mg by mouth Daily.   • Cyanocobalamin (VITAMIN B12 PO) Take  by mouth Daily.   • Flaxseed, Linseed, (FLAXSEED OIL PO) Take  by mouth Daily.   • metoprolol tartrate (LOPRESSOR) 50 MG tablet TAKE ONE TABLET BY MOUTH TWICE A DAY   • Multiple Vitamin tablet Take  by mouth daily.   • Omega 3 1000 MG capsule Take 1 capsule by mouth Daily.     No current facility-administered medications on file prior to visit.          Review of Systems   Constitution: Negative for malaise/fatigue.   Cardiovascular: Negative for chest pain and leg swelling.   Respiratory: Negative for shortness of breath.    Neurological: Positive for light-headedness.   All other systems reviewed and are negative.      OBJECTIVE:   Vital Signs  Vitals:    19 1115   BP: 130/78   Pulse: 77   SpO2: 98%     Estimated body mass index is 24.28 kg/m² as calculated from the following:    Height as of this encounter: 170.2 cm (67\").    Weight as of this " encounter: 70.3 kg (155 lb).    Physical Exam   Constitutional: She is oriented to person, place, and time. Vital signs are normal. She appears well-developed and well-nourished. She is active.   Eyes: Conjunctivae are normal.   Neck: Carotid bruit is not present.   Cardiovascular: Normal rate, regular rhythm and normal heart sounds.   Pulmonary/Chest: Breath sounds normal.   Abdominal: Normal appearance.   Musculoskeletal:   No cyanosis, clubbing, edema  Normal ROM   Neurological: She is alert and oriented to person, place, and time. GCS eye subscore is 4. GCS verbal subscore is 5. GCS motor subscore is 6.   Skin: Skin is warm, dry and intact.   Psychiatric: She has a normal mood and affect. Her speech is normal and behavior is normal. Judgment and thought content normal. Cognition and memory are normal.         ECG 12 Lead  Date/Time: 1/25/2019 11:23 AM  Performed by: Laurel Reynolds APRN  Authorized by: Laurel Reynolds APRN   Comparison: compared with previous ECG from 1/23/2014  Similar to previous ECG  Rhythm: sinus rhythm  Rate: normal  BPM: 77  Conduction: right bundle branch block and LPFB  QRS axis: normal  Clinical impression: abnormal ECG            Cardiac Procedures:  1. Mitral valve repair, tricuspid valve repair, CABG 12/22/11: Mitral valve repair with a 24 mm [employer] physio-II annuloplasty ring, tricuspid valve repair with a 26 mm [employer] MC 3 annuloplasty ring, PFO closure and 3 vessel coronary bypass grafting LIMA to LAD, saphenous vein graft to second diagonal and saphenous vein graft to second obtuse marginal branch.  2. ROSINA 10/4/12: Normal EF 60-65%.  Moderate residual mitral regurgitation through the ring.  3. Cavotricuspid isthmus ablation on 11/8/12  4. Ectopic right atrial tachycardia ablation 11/27/12  5. Echocardiogram 6/30/16: EF 60%.  Mildly dilated left atrium, mild mitral regurgitation through the annuloplasty ring, elevated gradients across the mitral  annuloplasty ring with a peak gradient of 15 mmHg and a mean gradient of 7 mmHg.  Trace tricuspid regurgitation through the annuloplasty ring.  6. Echocardiogram 1/22/18: EF 62%.  Mild LVH.  The left atrium is mildly dilated.  Mild mitral valve regurgitation through the mitral annuloplasty ring.  Gradients across mitral valve annuloplasty were elevated with a peak gradient of 11 mmHg and mean gradient of 10 mmHg.  Trace tricuspid regurgitation through the annuloplasty ring.  7. 24-hour Holter 4/19/18: Normal monitor study.        ASSESSMENT:      Diagnosis Plan   1. Atherosclerosis of coronary artery of native heart without angina pectoris, unspecified vessel or lesion type     2. Typical atrial flutter (CMS/HCC)     3. S/P mitral valve repair     4. Hx of tricuspid valve repair           PLAN OF CARE:     1. History of coronary artery disease: Patient denies any chest pain, shortness of breath.  Reports that overall she is doing well over the past 6 months.  Patient will continue on aspirin and Lopressor.  2. Atrial flutter: Patient in sinus rhythm on ECG today.  She denies any episodes of tachycardias or heart palpitations.  Patient is currently not anticoagulated because she has not had episodes of atrial flutter since her ablation.  She does take aspirin.  3. Status post mitral valve repair  4. Status post tricuspid valve repair.  5. Follow-up with Dr. Burleson in 6 months or sooner with any problems or complications.      Coronary Artery Disease  Assessment  • The patient has no angina    Plan  • Lifestyle modifications discussed include adhering to a heart healthy diet, avoidance of tobacco products, maintenance of a healthy weight, medication compliance, regular exercise and regular monitoring of cholesterol and blood pressure    Subjective - Objective  • There is a history of previous coronary artery bypass graft 2011    • Current antiplatelet therapy includes aspirin 81 mg      Atrial Fibrillation and  Atrial Flutter  Assessment  • The patient has paroxysmal atrial flutter  • The patient's CHADS2-VASc score is 3  • A [id number] score of 2 or more is considered a high risk for a thromboembolic event  • Aspirin prescribed    Plan  • Attempt to maintain sinus rhythm  • Continue aspirin for antithrombotic therapy, bleeding issues discussed  • Continue beta blocker for rhythm control  • Continue beta blocker for rate control      Thank you for allowing me to participate in the care of your patient,      Sincerely,   EVERARDO Maxwell  [facility]  01/25/19  12:59 PM    **Rodolfo Disclaimer:**  Much of this encounter note is an electronic transcription/translation of spoken language to printed text. The electronic translation of spoken language may permit erroneous, or at times, nonsensical words or phrases to be inadvertently transcribed. Although I have reviewed the note for such errors, some may still exist.

## 2019-08-05 ENCOUNTER — OFFICE VISIT (OUTPATIENT)
Dept: CARDIOLOGY | Facility: CLINIC | Age: 79
End: 2019-08-05

## 2019-08-05 VITALS
SYSTOLIC BLOOD PRESSURE: 122 MMHG | HEART RATE: 75 BPM | WEIGHT: 154 LBS | HEIGHT: 67 IN | BODY MASS INDEX: 24.17 KG/M2 | DIASTOLIC BLOOD PRESSURE: 74 MMHG | OXYGEN SATURATION: 98 %

## 2019-08-05 DIAGNOSIS — Z98.890 S/P MITRAL VALVE REPAIR: ICD-10-CM

## 2019-08-05 DIAGNOSIS — I25.10 ATHEROSCLEROSIS OF CORONARY ARTERY OF NATIVE HEART WITHOUT ANGINA PECTORIS, UNSPECIFIED VESSEL OR LESION TYPE: ICD-10-CM

## 2019-08-05 DIAGNOSIS — Z98.890 HX OF TRICUSPID VALVE REPAIR: ICD-10-CM

## 2019-08-05 DIAGNOSIS — I34.0 NON-RHEUMATIC MITRAL REGURGITATION: Primary | ICD-10-CM

## 2019-08-05 DIAGNOSIS — I48.3 TYPICAL ATRIAL FLUTTER (HCC): ICD-10-CM

## 2019-08-05 PROCEDURE — 99214 OFFICE O/P EST MOD 30 MIN: CPT | Performed by: NURSE PRACTITIONER

## 2019-08-05 NOTE — PROGRESS NOTES
[facility]      Patient Name: Yun Han  :1940  Age: 78 y.o.  Primary Cardiologist: Beto Burleson MD  Encounter Provider:  EVERARDO Maxwell      Chief Complaint:   Chief Complaint   Patient presents with   • Follow-up     6 months         HPI  Yun Han is a 78 y.o. female who presents today for semiannual evaluation. Pt has a  history significant for coronary artery disease, atrial flutter, mitral valve regurgitation, TIA, hypertension, carotid artery plaque, mitral valve insufficiency status post mitral valve repair.  Patient reports that she is doing fairly well from a cardiac standpoint over the past 6 months.  She does complain of some left-sided back pain.  She reports that she has been using hedge clippers in her yard.  She denies any problems with urination.  Patient does state that she notices some intermittent elevation in her heart rate when she is in pain or when she is working in the yard.  She reports that it does return to baseline when her pain is controlled and when she rest after working.  Patient does have an appoint with her primary care physician this week to be evaluated for her back pain.  The pain is to the left lower region of her back and not in the thoracic region.  Patient denies any episodes of chest pain, shortness of breath, palpitations, lightheadedness, swelling, fatigue.    The following portions of the patient's history were reviewed and updated as appropriate: allergies, current medications, past family history, past medical history, past social history, past surgical history and problem list.      Review of Systems   Constitution: Negative for malaise/fatigue.   Cardiovascular: Negative for chest pain and leg swelling.   Respiratory: Negative for shortness of breath.    Musculoskeletal: Positive for back pain.   Neurological: Negative for light-headedness.   All other systems reviewed and are negative.      OBJECTIVE:   Vital  "Signs  Vitals:    08/05/19 1040   BP: 122/74   Pulse: 75   SpO2: 98%     Estimated body mass index is 24.12 kg/m² as calculated from the following:    Height as of this encounter: 170.2 cm (67\").    Weight as of this encounter: 69.9 kg (154 lb).    Physical Exam   Constitutional: She is oriented to person, place, and time. Vital signs are normal. She appears well-developed and well-nourished. She is active.   Eyes: Conjunctivae are normal.   Neck: Carotid bruit is not present.   Cardiovascular: Normal rate, regular rhythm and normal heart sounds.   Pulmonary/Chest: Breath sounds normal.   Abdominal: Normal appearance.   Musculoskeletal:   No cyanosis, clubbing, edema  Normal ROM   Neurological: She is alert and oriented to person, place, and time. GCS eye subscore is 4. GCS verbal subscore is 5. GCS motor subscore is 6.   Skin: Skin is warm, dry and intact.   Psychiatric: She has a normal mood and affect. Her speech is normal and behavior is normal. Judgment and thought content normal. Cognition and memory are normal.       Procedures    Cardiac Procedures:  1. Mitral valve repair, tricuspid valve repair, CABG 12/22/11: Mitral valve repair with a 24 mm [employer] physio-II annuloplasty ring, tricuspid valve repair with a 26 mm [employer] MC 3 annuloplasty ring, PFO closure and 3 vessel coronary bypass grafting LIMA to LAD, saphenous vein graft to second diagonal and saphenous vein graft to second obtuse marginal branch.  2. ROSINA 10/4/12: Normal EF 60-65%.  Moderate residual mitral regurgitation through the ring.  3. Cavotricuspid isthmus ablation on 11/8/12  4. Ectopic right atrial tachycardia ablation 11/27/12  5. Echocardiogram 6/30/16: EF 60%.  Mildly dilated left atrium, mild mitral regurgitation through the annuloplasty ring, elevated gradients across the mitral annuloplasty ring with a peak gradient of 15 mmHg and a mean gradient of 7 mmHg.  Trace tricuspid regurgitation through the " annuloplasty ring.  6. Echocardiogram 1/22/18: EF 62%.  Mild LVH.  The left atrium is mildly dilated.  Mild mitral valve regurgitation through the mitral annuloplasty ring.  Gradients across mitral valve annuloplasty were elevated with a peak gradient of 11 mmHg and mean gradient of 10 mmHg.  Trace tricuspid regurgitation through the annuloplasty ring.  7. 24-hour Holter 4/19/18: Normal monitor study.      ASSESSMENT:      Diagnosis Plan   1. Non-rheumatic mitral regurgitation  Adult Transthoracic Echo Complete W/ Cont if Necessary Per Protocol   2. S/P mitral valve repair  Adult Transthoracic Echo Complete W/ Cont if Necessary Per Protocol   3. Hx of tricuspid valve repair  Adult Transthoracic Echo Complete W/ Cont if Necessary Per Protocol   4. Atherosclerosis of coronary artery of native heart without angina pectoris, unspecified vessel or lesion type     5. Typical atrial flutter (CMS/HCC)           PLAN OF CARE:       1. History of coronary artery disease:  Denies any angina or dyspnea.  Patient stays very active caring for her home in her yard.  She is on GDMT with aspirin, metoprolol.  2. Atrial flutter:  She denies any episodes of tachycardias or heart palpitations.  Patient is currently not anticoagulated because she has not had episodes of atrial flutter since her ablation.  She does take aspirin.  3. Status post mitral valve repair.  Echocardiogram prior to next appointment.  4. Status post tricuspid valve repair.  Echocardiogram prior to next appointment.  5. Follow-up with Dr. Burleson in 6 months or sooner with any problems or complications.          Coronary Artery Disease  Assessment  • The patient has no angina    Plan  • Lifestyle modifications discussed include adhering to a heart healthy diet, avoidance of tobacco products, maintenance of a healthy weight, medication compliance, regular exercise and regular monitoring of cholesterol and blood pressure    Subjective - Objective  • There is a  history of previous coronary artery bypass graft 2011    • Current antiplatelet therapy includes aspirin 81 mg      Atrial Fibrillation and Atrial Flutter  Assessment  • The patient has paroxysmal atrial flutter  • The patient's CHADS2-VASc score is 3  • A [id number] score of 2 or more is considered a high risk for a thromboembolic event  • Aspirin prescribed    Plan  • Attempt to maintain sinus rhythm  • Continue aspirin for antithrombotic therapy, bleeding issues discussed  • Continue beta blocker for rhythm control  • Continue beta blocker for rate control       Discharge Medications           Accurate as of 8/5/19  4:38 PM. If you have any questions, ask your nurse or doctor.               Continue These Medications      Instructions Start Date   aspirin 325 MG tablet   325 mg, Oral, Daily      FLAXSEED OIL PO   Oral, Daily      metoprolol tartrate 50 MG tablet  Commonly known as:  LOPRESSOR   TAKE ONE TABLET BY MOUTH TWICE A DAY      Multiple Vitamin tablet   Oral, Daily      Omega 3 1000 MG capsule   1 capsule, Oral, Daily      VITAMIN B12 PO   Oral, Daily             Thank you for allowing me to participate in the care of your patient,      Sincerely,   EVERARDO Maxwell  [facility]  08/05/19  4:38 PM    **Rodolfo Disclaimer:**  Much of this encounter note is an electronic transcription/translation of spoken language to printed text. The electronic translation of spoken language may permit erroneous, or at times, nonsensical words or phrases to be inadvertently transcribed. Although I have reviewed the note for such errors, some may still exist.

## 2019-10-03 RX ORDER — METOPROLOL TARTRATE 50 MG/1
TABLET, FILM COATED ORAL
Qty: 180 TABLET | Refills: 1 | Status: SHIPPED | OUTPATIENT
Start: 2019-10-03 | End: 2020-04-06

## 2020-04-06 RX ORDER — METOPROLOL TARTRATE 50 MG/1
TABLET, FILM COATED ORAL
Qty: 180 TABLET | Refills: 3 | Status: SHIPPED | OUTPATIENT
Start: 2020-04-06 | End: 2021-03-29

## 2020-06-15 ENCOUNTER — OFFICE VISIT (OUTPATIENT)
Dept: CARDIOLOGY | Facility: CLINIC | Age: 80
End: 2020-06-15

## 2020-06-15 ENCOUNTER — HOSPITAL ENCOUNTER (OUTPATIENT)
Dept: CARDIOLOGY | Facility: HOSPITAL | Age: 80
Discharge: HOME | End: 2020-06-15
Admitting: NURSE PRACTITIONER

## 2020-06-15 VITALS
DIASTOLIC BLOOD PRESSURE: 80 MMHG | BODY MASS INDEX: 23.61 KG/M2 | OXYGEN SATURATION: 98 % | HEART RATE: 72 BPM | WEIGHT: 150.4 LBS | SYSTOLIC BLOOD PRESSURE: 128 MMHG | HEIGHT: 67 IN

## 2020-06-15 VITALS
DIASTOLIC BLOOD PRESSURE: 68 MMHG | SYSTOLIC BLOOD PRESSURE: 130 MMHG | WEIGHT: 154 LBS | HEIGHT: 67 IN | BODY MASS INDEX: 24.17 KG/M2 | HEART RATE: 78 BPM

## 2020-06-15 DIAGNOSIS — Z98.890 STATUS POST MITRAL VALVE REPAIR: ICD-10-CM

## 2020-06-15 DIAGNOSIS — Z98.890 HX OF TRICUSPID VALVE REPAIR: ICD-10-CM

## 2020-06-15 DIAGNOSIS — I34.0 NON-RHEUMATIC MITRAL REGURGITATION: ICD-10-CM

## 2020-06-15 DIAGNOSIS — Z86.79 HISTORY OF ATRIAL TACHYCARDIA: ICD-10-CM

## 2020-06-15 DIAGNOSIS — Z98.890 STATUS POST CATHETER ABLATION OF ATRIAL FLUTTER: ICD-10-CM

## 2020-06-15 DIAGNOSIS — Z98.890 STATUS POST TRICUSPID VALVE REPAIR: ICD-10-CM

## 2020-06-15 DIAGNOSIS — I25.810 CORONARY ARTERY DISEASE INVOLVING CORONARY BYPASS GRAFT OF NATIVE HEART WITHOUT ANGINA PECTORIS: Primary | ICD-10-CM

## 2020-06-15 DIAGNOSIS — Z98.890 S/P MITRAL VALVE REPAIR: ICD-10-CM

## 2020-06-15 LAB
ASCENDING AORTA: 2.8 CM
BH CV ECHO MEAS - ACS: 1.6 CM
BH CV ECHO MEAS - AO MAX PG (FULL): 4 MMHG
BH CV ECHO MEAS - AO MAX PG: 6.5 MMHG
BH CV ECHO MEAS - AO MEAN PG (FULL): 2.1 MMHG
BH CV ECHO MEAS - AO MEAN PG: 3.8 MMHG
BH CV ECHO MEAS - AO ROOT AREA (BSA CORRECTED): 1.4
BH CV ECHO MEAS - AO ROOT AREA: 5.3 CM^2
BH CV ECHO MEAS - AO ROOT DIAM: 2.6 CM
BH CV ECHO MEAS - AO V2 MAX: 127.6 CM/SEC
BH CV ECHO MEAS - AO V2 MEAN: 94 CM/SEC
BH CV ECHO MEAS - AO V2 VTI: 32 CM
BH CV ECHO MEAS - ASC AORTA: 2.8 CM
BH CV ECHO MEAS - AVA(I,A): 1.8 CM^2
BH CV ECHO MEAS - AVA(I,D): 1.8 CM^2
BH CV ECHO MEAS - AVA(V,A): 1.6 CM^2
BH CV ECHO MEAS - AVA(V,D): 1.6 CM^2
BH CV ECHO MEAS - BSA(HAYCOCK): 1.8 M^2
BH CV ECHO MEAS - BSA: 1.8 M^2
BH CV ECHO MEAS - BZI_BMI: 24.1 KILOGRAMS/M^2
BH CV ECHO MEAS - BZI_METRIC_HEIGHT: 170.2 CM
BH CV ECHO MEAS - BZI_METRIC_WEIGHT: 69.9 KG
BH CV ECHO MEAS - EDV(MOD-SP2): 64 ML
BH CV ECHO MEAS - EDV(MOD-SP4): 58 ML
BH CV ECHO MEAS - EDV(TEICH): 53.3 ML
BH CV ECHO MEAS - EF(CUBED): 67.6 %
BH CV ECHO MEAS - EF(MOD-BP): 60 %
BH CV ECHO MEAS - EF(MOD-SP2): 56.3 %
BH CV ECHO MEAS - EF(MOD-SP4): 63.8 %
BH CV ECHO MEAS - EF(TEICH): 60.1 %
BH CV ECHO MEAS - ESV(MOD-SP2): 28 ML
BH CV ECHO MEAS - ESV(MOD-SP4): 21 ML
BH CV ECHO MEAS - ESV(TEICH): 21.3 ML
BH CV ECHO MEAS - FS: 31.3 %
BH CV ECHO MEAS - IVS/LVPW: 1
BH CV ECHO MEAS - IVSD: 1.1 CM
BH CV ECHO MEAS - LAT PEAK E' VEL: 5.3 CM/SEC
BH CV ECHO MEAS - LV DIASTOLIC VOL/BSA (35-75): 32.1 ML/M^2
BH CV ECHO MEAS - LV MASS(C)D: 120.8 GRAMS
BH CV ECHO MEAS - LV MASS(C)DI: 66.7 GRAMS/M^2
BH CV ECHO MEAS - LV MAX PG: 2.5 MMHG
BH CV ECHO MEAS - LV MEAN PG: 1.7 MMHG
BH CV ECHO MEAS - LV SYSTOLIC VOL/BSA (12-30): 11.6 ML/M^2
BH CV ECHO MEAS - LV V1 MAX: 79.8 CM/SEC
BH CV ECHO MEAS - LV V1 MEAN: 63.7 CM/SEC
BH CV ECHO MEAS - LV V1 VTI: 22.4 CM
BH CV ECHO MEAS - LVIDD: 3.6 CM
BH CV ECHO MEAS - LVIDS: 2.5 CM
BH CV ECHO MEAS - LVLD AP2: 7.5 CM
BH CV ECHO MEAS - LVLD AP4: 7.1 CM
BH CV ECHO MEAS - LVLS AP2: 6.6 CM
BH CV ECHO MEAS - LVLS AP4: 6.4 CM
BH CV ECHO MEAS - LVOT AREA (M): 2.5 CM^2
BH CV ECHO MEAS - LVOT AREA: 2.6 CM^2
BH CV ECHO MEAS - LVOT DIAM: 1.8 CM
BH CV ECHO MEAS - LVPWD: 1.1 CM
BH CV ECHO MEAS - MED PEAK E' VEL: 2.6 CM/SEC
BH CV ECHO MEAS - MR MAX PG: 90.8 MMHG
BH CV ECHO MEAS - MR MAX VEL: 476.4 CM/SEC
BH CV ECHO MEAS - MV A DUR: 0.13 SEC
BH CV ECHO MEAS - MV A MAX VEL: 111.6 CM/SEC
BH CV ECHO MEAS - MV DEC SLOPE: 654.9 CM/SEC^2
BH CV ECHO MEAS - MV DEC TIME: 0.26 SEC
BH CV ECHO MEAS - MV E MAX VEL: 162 CM/SEC
BH CV ECHO MEAS - MV E/A: 1.5
BH CV ECHO MEAS - MV MAX PG: 19 MMHG
BH CV ECHO MEAS - MV MEAN PG: 7 MMHG
BH CV ECHO MEAS - MV P1/2T MAX VEL: 214.5 CM/SEC
BH CV ECHO MEAS - MV P1/2T: 95.9 MSEC
BH CV ECHO MEAS - MV V2 MAX: 215.8 CM/SEC
BH CV ECHO MEAS - MV V2 MEAN: 128.9 CM/SEC
BH CV ECHO MEAS - MV V2 VTI: 57.9 CM
BH CV ECHO MEAS - MVA P1/2T LCG: 1 CM^2
BH CV ECHO MEAS - MVA(P1/2T): 2.3 CM^2
BH CV ECHO MEAS - MVA(VTI): 0.99 CM^2
BH CV ECHO MEAS - PA ACC TIME: 0.09 SEC
BH CV ECHO MEAS - PA MAX PG (FULL): 1.6 MMHG
BH CV ECHO MEAS - PA MAX PG: 2.3 MMHG
BH CV ECHO MEAS - PA PR(ACCEL): 39.8 MMHG
BH CV ECHO MEAS - PA V2 MAX: 76 CM/SEC
BH CV ECHO MEAS - PULM A REVS DUR: 0.09 SEC
BH CV ECHO MEAS - PULM A REVS VEL: 19 CM/SEC
BH CV ECHO MEAS - PULM DIAS VEL: 45.8 CM/SEC
BH CV ECHO MEAS - PULM S/D: 0.74
BH CV ECHO MEAS - PULM SYS VEL: 33.9 CM/SEC
BH CV ECHO MEAS - PVA(V,A): 1.1 CM^2
BH CV ECHO MEAS - PVA(V,D): 1.1 CM^2
BH CV ECHO MEAS - QP/QS: 0.34
BH CV ECHO MEAS - RAP SYSTOLE: 3 MMHG
BH CV ECHO MEAS - RV BASE (<4.1) - OBSOLETE: 2.5 CM
BH CV ECHO MEAS - RV LENGTH (<8.5) - OBSOLETE: 5.8 CM
BH CV ECHO MEAS - RV MAX PG: 0.75 MMHG
BH CV ECHO MEAS - RV MEAN PG: 0.39 MMHG
BH CV ECHO MEAS - RV V1 MAX: 43.3 CM/SEC
BH CV ECHO MEAS - RV V1 MEAN: 29.6 CM/SEC
BH CV ECHO MEAS - RV V1 VTI: 9.8 CM
BH CV ECHO MEAS - RVOT AREA: 2 CM^2
BH CV ECHO MEAS - RVOT DIAM: 1.6 CM
BH CV ECHO MEAS - RVSP: 40 MMHG
BH CV ECHO MEAS - SI(AO): 93.7 ML/M^2
BH CV ECHO MEAS - SI(CUBED): 17 ML/M^2
BH CV ECHO MEAS - SI(LVOT): 31.6 ML/M^2
BH CV ECHO MEAS - SI(MOD-SP2): 19.9 ML/M^2
BH CV ECHO MEAS - SI(MOD-SP4): 20.4 ML/M^2
BH CV ECHO MEAS - SI(TEICH): 17.7 ML/M^2
BH CV ECHO MEAS - SUP REN AO DIAM: 1.4 CM
BH CV ECHO MEAS - SV(AO): 169.6 ML
BH CV ECHO MEAS - SV(CUBED): 30.7 ML
BH CV ECHO MEAS - SV(LVOT): 57.1 ML
BH CV ECHO MEAS - SV(MOD-SP2): 36 ML
BH CV ECHO MEAS - SV(MOD-SP4): 37 ML
BH CV ECHO MEAS - SV(RVOT): 19.4 ML
BH CV ECHO MEAS - SV(TEICH): 32 ML
BH CV ECHO MEAS - TAPSE (>1.6): 1.2 CM2
BH CV ECHO MEAS - TR MAX VEL: 304.1 CM/SEC
BH CV ECHO MEASUREMENTS AVERAGE E/E' RATIO: 41.01
BH CV XLRA - RV BASE: 2.5 CM
BH CV XLRA - RV LENGTH: 5.8 CM
BH CV XLRA - RV MID: 2.2 CM
BH CV XLRA - TDI S': 11 CM/SEC
LEFT ATRIUM VOLUME INDEX: 16 ML/M2
MAXIMAL PREDICTED HEART RATE: 139 BPM
SINUS: 2.3 CM
STJ: 2.6 CM
STRESS TARGET HR: 118 BPM

## 2020-06-15 PROCEDURE — 93306 TTE W/DOPPLER COMPLETE: CPT | Performed by: INTERNAL MEDICINE

## 2020-06-15 PROCEDURE — 93306 TTE W/DOPPLER COMPLETE: CPT

## 2020-06-15 PROCEDURE — 99213 OFFICE O/P EST LOW 20 MIN: CPT | Performed by: INTERNAL MEDICINE

## 2021-01-15 ENCOUNTER — OFFICE VISIT (OUTPATIENT)
Dept: CARDIOLOGY | Facility: CLINIC | Age: 81
End: 2021-01-15

## 2021-01-15 VITALS
DIASTOLIC BLOOD PRESSURE: 78 MMHG | SYSTOLIC BLOOD PRESSURE: 128 MMHG | HEIGHT: 67 IN | OXYGEN SATURATION: 98 % | WEIGHT: 155 LBS | HEART RATE: 79 BPM | BODY MASS INDEX: 24.33 KG/M2

## 2021-01-15 DIAGNOSIS — I25.810 CORONARY ARTERY DISEASE INVOLVING CORONARY BYPASS GRAFT OF NATIVE HEART WITHOUT ANGINA PECTORIS: Primary | ICD-10-CM

## 2021-01-15 DIAGNOSIS — Z98.890 HISTORY OF TRICUSPID VALVE REPAIR: ICD-10-CM

## 2021-01-15 DIAGNOSIS — I47.1 ATRIAL TACHYCARDIA (HCC): ICD-10-CM

## 2021-01-15 DIAGNOSIS — Z98.890 STATUS POST CATHETER ABLATION OF ATRIAL FLUTTER: ICD-10-CM

## 2021-01-15 DIAGNOSIS — Z98.890 HISTORY OF MITRAL VALVE REPAIR: ICD-10-CM

## 2021-01-15 PROCEDURE — 99213 OFFICE O/P EST LOW 20 MIN: CPT | Performed by: INTERNAL MEDICINE

## 2021-01-16 NOTE — PROGRESS NOTES
Date of Office Visit: 1/15/2021  Encounter Provider: Jerzy Burleson MD  Place of Service: [facility] CARDIOLOGY  Patient Name: Yun Han  :1940    Chief complaint: Follow-up for coronary artery disease, mitral valve repair, tricuspid  valve repair, atrial flutter s/p ablation, and atrial tachycardia s/p ablation.     History of Present Illness:    Dear Dr. Elizabeth:       I again had the pleasure of seeing your patient in cardiology office on 1/15/2021. As  you well know, she is a very pleasant 80 year-old white female with a past history  significant for coronary artery disease status post coronary artery bypass grafting,  valvular heart disease status post mitral valve and tricuspid valve repairs, and PFO  closure who presents for followup. The patient was originally admitted on 2011 with  a TIA. At that time, she was experiencing left arm paraesthesias, as well as facial  numbness. As part of her workup, she had a transesophageal echocardiogram on  2011. 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 There was  also significant valvular heart disease with severe mitral regurgitation and  mild-to-moderate tricuspid regurgitation. She followed up on this in the office for the  mitral regurgitation, and an exercise treadmill stress test was obtained on 11/3/2011 to  assess her functional capacity. She did manage to exercise for 5 minutes 35 seconds,  although she developed severe diffuse ST-T wave changes during the test, diagnostic for  ischemia. She then underwent a right and left heart catheterization on 2011 by Dr. Steve Black. She did have significant coronary artery disease in the LAD and left  circumflex systems. She then underwent a mitral valve repair, tricuspid valve repair, PFO   closure, and three vessel coronary artery bypass grafting operation on 2011 by Dr. Montana Saini at [facility]. She did develop some  postoperative atrial fibrillation,   although this converted with IV amiodarone.       The patient developed tachycardia at one of her rehab sessions on 2/29/2012 at [facility]. Two subsequent EKGs determined this to be atrial flutter. The patient  converted back to normal sinus rhythm on her own. She then was placed back on amio  as well as the Pradaxa. She had a recurrence of atrial flutter on 8/31/2012 and was  admitted at that time. The patient underwent a cavotricuspid isthmus ablation by Dr. Beto Lea at [facility] on 11/08/2012. However, she again presented to her clinical  appointment on 11/26/2012 with recurrent tachycardia into the 120s. She subsequently  underwent a repeat electrophysiology study on 11/27/2012, which showed an ectopic right  atrial tachycardia, which was successfully ablated as well.       The patient presents today for follow-up.  She is actually doing very well from a cardiac standpoint.  She denied any chest pain or shortness of breath.  She really has had no   new symptoms since her last visit.  Her blood pressure today is good at 128/78.  It has   also been good at home.    Past Medical History:   Diagnosis Date   • Allergic rhinitis    • Atherosclerosis of aorta (CMS/HCC)     Severe plaques in the descending aorta and aortic arch by ROSINA on 10/4/12   • Atrial fibrillation (CMS/HCC)     Post-op after CABG in 12/11    • Atrial flutter (CMS/HCC)     s/p cavotricuspid isthmus ablation on 11/8/12 by Dr. Beto Lea at [facility].     • Bifascicular bundle branch block     RBBB and left posterior hemiblock    • Carotid artery plaque     CTA of head and neck in 9/11 showed moderate calcification at the right carotid bulb causing less than 50% narrowing    • Coronary artery disease     s/p CABG    • Depression with anxiety    • History of atrial flutter     s/p cavotricuspid isthmus ablation on 11/8/12 by Dr Lea at [facility]     • Hypertension    • Mitral valve  insufficiency     Moderate residual MR through the repair by ROSINA on 10/4/12. Mild MR by echo on 2/6/14 and 6/30/16.   • MR (mitral regurgitation)    • PAT (paroxysmal atrial tachycardia) (CMS/HCC)     Ectopic right atrial tachycardia, s/p ablation by Dr. Lea at [facility] on 11/27/12.    • Statin intolerance     Severe myalgias with Crestor, Lipitor, and pravastatin    • Transient cerebral ischemia     Two TIA's in 2011        Past Surgical History:   Procedure Laterality Date   • CARPAL TUNNEL RELEASE      Neurplasty decompression Median Nerve at Carpal Tunnel    • CORONARY ARTERY BYPASS GRAFT      3 Vessel CABG, MV repair, TV repair, and PFO closure on 12/22/2011 by Dr. Saini at [facility]. LIMA to the LAD, SVG-D2, and SVG-OM2.   • MITRAL VALVE REPAIR/REPLACEMENT     • PATENT FORAMEN OVALE CLOSURE     • TONSILLECTOMY     • TOTAL ABDOMINAL HYSTERECTOMY      2000    • TRICUSPID VALVE SURGERY         Current Outpatient Medications on File Prior to Visit   Medication Sig Dispense Refill   • aspirin 325 MG tablet Take 325 mg by mouth Daily.     • Cyanocobalamin (VITAMIN B12 PO) Take  by mouth Daily.     • Flaxseed, Linseed, (FLAXSEED OIL PO) Take  by mouth Daily.     • metoprolol tartrate (LOPRESSOR) 50 MG tablet TAKE ONE TABLET BY MOUTH TWICE A  tablet 3   • Multiple Vitamin tablet Take  by mouth daily.     • Omega 3 1000 MG capsule Take 1 capsule by mouth Daily.       No current facility-administered medications on file prior to visit.      Allergies as of 01/15/2021 - Reviewed 01/15/2021   Allergen Reaction Noted   • Atorvastatin  06/06/2016   • Codeine  06/06/2016   • Rosuvastatin Myalgia 06/06/2016   • Sulfa antibiotics  06/06/2016     Social History     Socioeconomic History   • Marital status:      Spouse name: Not on file   • Number of children: Not on file   • Years of education: Not on file   • Highest education level: Not on file   Tobacco Use   • Smoking status: Never Smoker   •  "Smokeless tobacco: Never Used   Substance and Sexual Activity   • Alcohol use: Yes     Comment: Rarely    • Drug use: No     Family History   Problem Relation Age of Onset   • Stroke Mother 87         from CVA    • Coronary artery disease Father 72        Fatal MI at age 72    • Coronary artery disease Sister         CABG and stenting    • Coronary artery disease Brother 71        Fatal MI at age 71       Review of Systems   Constitution: Positive for malaise/fatigue.   All other systems reviewed and are negative.     Objective:     Vitals:    01/15/21 1357   BP: 128/78   Pulse: 79   SpO2: 98%   Weight: 70.3 kg (155 lb)   Height: 170.2 cm (67.01\")     Body mass index is 24.27 kg/m².    Physical Exam   Constitutional: She is oriented to person, place, and time. She appears well-developed and well-nourished.   HENT:   Head: Normocephalic and atraumatic.   Eyes: Conjunctivae are normal.   Neck: Neck supple.   Cardiovascular: Normal rate and regular rhythm. Exam reveals no gallop and no friction rub.   Murmur heard.   Systolic murmur is present with a grade of 2/6 at the lower left sternal border.  Pulmonary/Chest: Effort normal and breath sounds normal.   Abdominal: Soft. There is no abdominal tenderness.   Musculoskeletal:         General: No edema.   Neurological: She is alert and oriented to person, place, and time.   Skin: Skin is warm.   Psychiatric: She has a normal mood and affect. Her behavior is normal.     Lab Review:   Procedures    Cardiac Procedures:  1. Status post mitral valve repair with a 24 mm [employer] physio II  annuloplasty ring, tricuspid valve repair with a 26 mm [employer] MC3   annuloplasty ring, PFO closure and three vessel coronary artery bypass grafting by   Dr. Montana Saini on 2011. Her bypass anatomy included a LIMA to the LAD,   saphenous vein graft to second diagonal, and saphenous vein graft to second   obtuse marginal branch.   2. Transesophageal " echocardiogram on 10/04/2012 revealed a normal ejection   fraction of 60-65%. There was moderate residual mitral regurgitation through the   ring.   3. Status post cavotricuspid isthmus ablation on 11/08/2012 by Dr. Beto Lea at   [facility] for atrial flutter.   4. Status post ectopic right atrial tachycardia ablation by Dr. Beto Lea on   11/27/2012.   5. Echo 6/30/2016: Ejection fraction of 60%, mildly dilated left atrium, mild mitral   regurgitation through the annuloplasty ring, elevated gradients across the mitral   annuloplasty ring with a peak gradient of 15 mmHg and a mean gradient of 7   mmHg, and trace tricuspid regurgitation through the annuloplasty ring.  6. Echocardiogram on 1/22/2018: The ejection fraction was 62%.  There was mild   LVH.  The left atrium was mildly dilated.  There was mild regurgitation through the   mitral annuloplasty ring.  Gradients across mitral valve annuloplasty were elevated   with a peak of 18 mmHg, and a mean of 10 mmHg.  There was trace tricuspid   regurgitation through the annuloplasty ring.  7.  Echocardiogram on 6/15/2020: The ejection fraction was 60%. There was mild   LVH.  The left atrium was mildly enlarged.  Gradients across the mitral annuloplasty   were elevated with a peak of 19 mmHg and a mean of 7 mmHg.  There was mild   tricuspid regurgitation through the annuloplasty ring.  The calculated RVSP was 40   mmHg.    Assessment:       Diagnosis Plan   1. Coronary artery disease involving coronary bypass graft of native heart without angina pectoris     2. History of mitral valve repair     3. History of tricuspid valve repair     4. Status post catheter ablation of atrial flutter     5. Atrial tachycardia (CMS/HCC)       Plan:       She has had no new symptoms, and she feels well.  Her blood pressure has been well controlled.  She is going to continue on the metoprolol at 50 mg twice a day.  She has not had any recent arrhythmias.  She will continue on the  aspirin given her history of coronary artery disease.  Her latest echocardiogram from June 2020 showed elevated gradients across the mitral valve repair, although she continues to be completely asymptomatic.  Both valve repairs still looked good in general.  She has been intolerant to multiple statin medications in the past.  Her lipid panel has actually been too low to qualify for Repatha or Praluent in the past.  I am going to plan on seeing her back in the office in about 6 months.

## 2021-03-29 RX ORDER — METOPROLOL TARTRATE 50 MG/1
TABLET, FILM COATED ORAL
Qty: 180 TABLET | Refills: 2 | Status: SHIPPED | OUTPATIENT
Start: 2021-03-29 | End: 2021-12-27

## 2021-08-06 ENCOUNTER — OFFICE VISIT (OUTPATIENT)
Dept: CARDIOLOGY | Facility: CLINIC | Age: 81
End: 2021-08-06

## 2021-08-06 VITALS
DIASTOLIC BLOOD PRESSURE: 72 MMHG | SYSTOLIC BLOOD PRESSURE: 120 MMHG | HEIGHT: 68 IN | WEIGHT: 151 LBS | BODY MASS INDEX: 22.88 KG/M2 | HEART RATE: 78 BPM

## 2021-08-06 DIAGNOSIS — I45.2 BIFASCICULAR BLOCK: ICD-10-CM

## 2021-08-06 DIAGNOSIS — I25.810 CORONARY ARTERY DISEASE INVOLVING CORONARY BYPASS GRAFT OF NATIVE HEART WITHOUT ANGINA PECTORIS: Primary | ICD-10-CM

## 2021-08-06 DIAGNOSIS — Z98.890 STATUS POST CATHETER ABLATION OF ATRIAL FLUTTER: ICD-10-CM

## 2021-08-06 DIAGNOSIS — I47.1 ATRIAL TACHYCARDIA (HCC): ICD-10-CM

## 2021-08-06 DIAGNOSIS — Z98.890 HISTORY OF TRICUSPID VALVE REPAIR: ICD-10-CM

## 2021-08-06 DIAGNOSIS — Z98.890 HISTORY OF REPAIR OF MITRAL VALVE: ICD-10-CM

## 2021-08-06 PROCEDURE — 99213 OFFICE O/P EST LOW 20 MIN: CPT | Performed by: INTERNAL MEDICINE

## 2021-08-06 PROCEDURE — 93000 ELECTROCARDIOGRAM COMPLETE: CPT | Performed by: INTERNAL MEDICINE

## 2021-08-08 NOTE — PROGRESS NOTES
Date of Office Visit: 2021  Encounter Provider: Jerzy Burleson MD  Place of Service: [facility] CARDIOLOGY  Patient Name: Yun Han  :1940    Chief complaint: Follow-up for coronary artery disease, mitral valve repair, tricuspid  valve repair, atrial flutter s/p ablation, atrial tachycardia s/p ablation, and bifascicular   block.     History of Present Illness:    Dear Dr. Elizabeth:       I again had the pleasure of seeing your patient in cardiology office on 2021.  As  you well know, she is a very pleasant 80 year-old white female with a past history  significant for coronary artery disease status post coronary artery bypass grafting,  valvular heart disease status post mitral valve and tricuspid valve repairs, and PFO  closure who presents for followup. The patient was originally admitted on 2011 with  a TIA. At that time, she was experiencing left arm paraesthesias, as well as facial  numbness. As part of her workup, she had a transesophageal echocardiogram on  2011. 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 There was  also significant valvular heart disease with severe mitral regurgitation and  mild-to-moderate tricuspid regurgitation. She followed up on this in the office for the  mitral regurgitation, and an exercise treadmill stress test was obtained on 11/3/2011 to  assess her functional capacity. She did manage to exercise for 5 minutes 35 seconds,  although she developed severe diffuse ST-T wave changes during the test, diagnostic for  ischemia. She then underwent a right and left heart catheterization on 2011 by Dr. Steve Black. She did have significant coronary artery disease in the LAD and left  circumflex systems. She then underwent a mitral valve repair, tricuspid valve repair, PFO   closure, and three vessel coronary artery bypass grafting operation on 2011 by Dr. Montana Saini at [facility].  She did develop some postoperative atrial fibrillation,   although this converted with IV amiodarone.       The patient developed tachycardia at one of her rehab sessions on 2/29/2012 at [facility]. Two subsequent EKGs determined this to be atrial flutter. The patient  converted back to normal sinus rhythm on her own. She then was placed back on amio  as well as the Pradaxa. She had a recurrence of atrial flutter on 8/31/2012 and was  admitted at that time. The patient underwent a cavotricuspid isthmus ablation by Dr. Beto Lea at [facility] on 11/08/2012. However, she again presented to her clinical  appointment on 11/26/2012 with recurrent tachycardia into the 120s. She subsequently  underwent a repeat electrophysiology study on 11/27/2012, which showed an ectopic right  atrial tachycardia, which was successfully ablated as well.  She does have a baseline   right bundle branch block and left posterior fascicular block as well.        The patient presents today for follow-up.  Overall, she has actually been doing very well.        The main issue is that she has felt poorly since her COVID-19 vaccine in February 2021.        She has had some neurological symptoms since that time which have been evaluated.        However, she has not had any increasing shortness of breath, chest discomfort, or other       new cardiac symptoms.        Past Medical History:   Diagnosis Date   • Allergic rhinitis    • Atherosclerosis of aorta (CMS/HCC)     Severe plaques in the descending aorta and aortic arch by ROSINA on 10/4/12   • Atrial fibrillation (CMS/HCC)     Post-op after CABG in 12/11    • Atrial flutter (CMS/HCC)     s/p cavotricuspid isthmus ablation on 11/8/12 by Dr. Beto Lea at [facility].     • Bifascicular bundle branch block     RBBB and left posterior hemiblock    • Carotid artery plaque     CTA of head and neck in 9/11 showed moderate calcification at the right carotid bulb causing less than  50% narrowing    • Coronary artery disease     s/p CABG    • Depression with anxiety    • History of atrial flutter     s/p cavotricuspid isthmus ablation on 11/8/12 by Dr Lea at [facility]     • Hypertension    • Mitral valve insufficiency     Moderate residual MR through the repair by ROSINA on 10/4/12. Mild MR by echo on 2/6/14 and 6/30/16.   • MR (mitral regurgitation)    • PAT (paroxysmal atrial tachycardia) (CMS/HCC)     Ectopic right atrial tachycardia, s/p ablation by Dr. Lea at [facility] on 11/27/12.    • Statin intolerance     Severe myalgias with Crestor, Lipitor, and pravastatin    • Transient cerebral ischemia     Two TIA's in 2011        Past Surgical History:   Procedure Laterality Date   • CARPAL TUNNEL RELEASE      Neurplasty decompression Median Nerve at Carpal Tunnel    • CORONARY ARTERY BYPASS GRAFT      3 Vessel CABG, MV repair, TV repair, and PFO closure on 12/22/2011 by Dr. Saini at [facility]. LIMA to the LAD, SVG-D2, and SVG-OM2.   • MITRAL VALVE REPAIR/REPLACEMENT     • PATENT FORAMEN OVALE CLOSURE     • TONSILLECTOMY     • TOTAL ABDOMINAL HYSTERECTOMY      2000    • TRICUSPID VALVE SURGERY         Current Outpatient Medications on File Prior to Visit   Medication Sig Dispense Refill   • aspirin 325 MG tablet Take 325 mg by mouth Daily.     • Cyanocobalamin (VITAMIN B12 PO) Take  by mouth Daily.     • Flaxseed, Linseed, (FLAXSEED OIL PO) Take  by mouth Daily.     • metoprolol tartrate (LOPRESSOR) 50 MG tablet TAKE ONE TABLET BY MOUTH TWICE A  tablet 2   • Omega 3 1000 MG capsule Take 1 capsule by mouth Daily.       No current facility-administered medications on file prior to visit.     Allergies as of 08/06/2021 - Reviewed 08/06/2021   Allergen Reaction Noted   • Atorvastatin  06/06/2016   • Codeine  06/06/2016   • Rosuvastatin Myalgia 06/06/2016   • Sulfa antibiotics  06/06/2016     Social History     Socioeconomic History   • Marital status:      Spouse  "name: Not on file   • Number of children: Not on file   • Years of education: Not on file   • Highest education level: Not on file   Tobacco Use   • Smoking status: Never Smoker   • Smokeless tobacco: Never Used   Substance and Sexual Activity   • Alcohol use: Yes     Comment: Rarely    • Drug use: No     Family History   Problem Relation Age of Onset   • Stroke Mother 87         from CVA    • Coronary artery disease Father 72        Fatal MI at age 72    • Coronary artery disease Sister         CABG and stenting    • Coronary artery disease Brother 71        Fatal MI at age 71       Review of Systems   Constitutional: Positive for malaise/fatigue.   Musculoskeletal: Positive for joint pain and muscle cramps.   All other systems reviewed and are negative.     Objective:     Vitals:    21 1447   BP: 120/72   Pulse: 78   Weight: 68.5 kg (151 lb)   Height: 172.7 cm (68\")     Body mass index is 22.96 kg/m².    Physical Exam  Constitutional:       Appearance: She is well-developed.   HENT:      Head: Normocephalic and atraumatic.   Eyes:      Conjunctiva/sclera: Conjunctivae normal.   Cardiovascular:      Rate and Rhythm: Normal rate and regular rhythm.      Heart sounds: Murmur heard.   Systolic murmur is present with a grade of 2/6 at the lower left sternal border.   No friction rub. No gallop.    Pulmonary:      Effort: Pulmonary effort is normal.      Breath sounds: Normal breath sounds.   Abdominal:      Palpations: Abdomen is soft.      Tenderness: There is no abdominal tenderness.   Musculoskeletal:      Cervical back: Neck supple.   Skin:     General: Skin is warm.   Neurological:      Mental Status: She is alert and oriented to person, place, and time.   Psychiatric:         Behavior: Behavior normal.       Lab Review:     ECG 12 Lead    Date/Time: 2021 1:19 PM  Performed by: Jerzy Burleson MD  Authorized by: Jerzy Burleson MD   Comparison: compared with previous ECG from " 1/25/2019  Similar to previous ECG  Rhythm: sinus rhythm  Rate: normal  BPM: 78  Conduction: right bundle branch block and left posterior fascicular block    Clinical impression: abnormal EKG            Cardiac Procedures:  1. Status post mitral valve repair with a 24 mm [employer] physio II  annuloplasty ring, tricuspid valve repair with a 26 mm [employer] MC3   annuloplasty ring, PFO closure and three vessel coronary artery bypass grafting by   Dr. Montana Saini on 12/22/2011. Her bypass anatomy included a LIMA to the LAD,   saphenous vein graft to second diagonal, and saphenous vein graft to second   obtuse marginal branch.   2. Transesophageal echocardiogram on 10/04/2012 revealed a normal ejection   fraction of 60-65%. There was moderate residual mitral regurgitation through the   ring.   3. Status post cavotricuspid isthmus ablation on 11/08/2012 by Dr. Beto Lea at   [facility] for atrial flutter.   4. Status post ectopic right atrial tachycardia ablation by Dr. Beto Lea on   11/27/2012.   5. Echo 6/30/2016: Ejection fraction of 60%, mildly dilated left atrium, mild mitral   regurgitation through the annuloplasty ring, elevated gradients across the mitral   annuloplasty ring with a peak gradient of 15 mmHg and a mean gradient of 7   mmHg, and trace tricuspid regurgitation through the annuloplasty ring.  6. Echocardiogram on 1/22/2018: The ejection fraction was 62%.  There was mild   LVH.  The left atrium was mildly dilated.  There was mild regurgitation through the   mitral annuloplasty ring.  Gradients across mitral valve annuloplasty were elevated   with a peak of 18 mmHg, and a mean of 10 mmHg.  There was trace tricuspid   regurgitation through the annuloplasty ring.  7.  Echocardiogram on 6/15/2020: The ejection fraction was 60%. There was mild   LVH.  The left atrium was mildly enlarged.  Gradients across the mitral annuloplasty   were elevated with a peak of 19 mmHg and a mean  of 7 mmHg.  There was mild   tricuspid regurgitation through the annuloplasty ring.  The calculated RVSP was 40   mmHg.    Assessment:       Diagnosis Plan   1. Coronary artery disease involving coronary bypass graft of native heart without angina pectoris     2. History of repair of mitral valve     3. History of tricuspid valve repair     4. Status post catheter ablation of atrial flutter     5. Atrial tachycardia (CMS/HCC)     6. Bifascicular block       Plan:     Current pain issues have been feeling poorly since the COVID-19 vaccine in February 2021.  However, from a cardiac standpoint, she has been doing well.  Her EKG again shows a stable left posterior fascicular block and right bundle branch block.  Her blood pressure has been excellent, and she will continue on the metoprolol at 50 mg twice a day.  She has not had any recent arrhythmias.  She will continue on the aspirin given her history of coronary artery disease.  Her latest echocardiogram from June 2020 showed elevated gradients across the mitral valve repair, although she continues to be completely asymptomatic.  Both valve repairs still looked good in general.  She has been intolerant to multiple statin medications in the past.  Her lipid panel has actually been too low to qualify for Repatha or Praluent in the past.  I am going to plan on seeing her back in the office in 6 months.

## 2021-12-27 RX ORDER — METOPROLOL TARTRATE 50 MG/1
TABLET, FILM COATED ORAL
Qty: 180 TABLET | Refills: 2 | Status: SHIPPED | OUTPATIENT
Start: 2021-12-27 | End: 2022-09-27 | Stop reason: SDUPTHER

## 2022-05-20 ENCOUNTER — OFFICE VISIT (OUTPATIENT)
Dept: CARDIOLOGY | Facility: CLINIC | Age: 82
End: 2022-05-20

## 2022-05-20 VITALS
WEIGHT: 148 LBS | DIASTOLIC BLOOD PRESSURE: 80 MMHG | HEART RATE: 84 BPM | OXYGEN SATURATION: 98 % | HEIGHT: 68 IN | RESPIRATION RATE: 16 BRPM | BODY MASS INDEX: 22.43 KG/M2 | SYSTOLIC BLOOD PRESSURE: 136 MMHG

## 2022-05-20 DIAGNOSIS — Z98.890 HISTORY OF TRICUSPID VALVE REPAIR: ICD-10-CM

## 2022-05-20 DIAGNOSIS — I25.810 CORONARY ARTERY DISEASE INVOLVING CORONARY BYPASS GRAFT OF NATIVE HEART WITHOUT ANGINA PECTORIS: Primary | ICD-10-CM

## 2022-05-20 DIAGNOSIS — Z98.890 STATUS POST ABLATION OF ATRIAL FLUTTER: ICD-10-CM

## 2022-05-20 DIAGNOSIS — Z86.79 STATUS POST ABLATION OF ATRIAL FLUTTER: ICD-10-CM

## 2022-05-20 DIAGNOSIS — I45.2 BIFASCICULAR BLOCK: ICD-10-CM

## 2022-05-20 DIAGNOSIS — Z98.890 HISTORY OF MITRAL VALVE REPAIR: ICD-10-CM

## 2022-05-20 PROCEDURE — 99213 OFFICE O/P EST LOW 20 MIN: CPT | Performed by: INTERNAL MEDICINE

## 2022-05-20 RX ORDER — LANOLIN ALCOHOL/MO/W.PET/CERES
50 CREAM (GRAM) TOPICAL DAILY
COMMUNITY

## 2022-06-12 NOTE — PROGRESS NOTES
Date of Office Visit: 2022  Encounter Provider: Jerzy Burleson MD  Place of Service: [facility] CARDIOLOGY  Patient Name: Yun Han  :1940    Chief complaint: Follow-up for coronary artery disease, mitral valve repair, tricuspid  valve repair, atrial flutter s/p ablation, atrial tachycardia s/p ablation, and bifascicular   block.     History of Present Illness:    Dear Dr. Elizabeth:       I again had the pleasure of seeing your patient in cardiology office on 2022.  As  you well know, she is a very pleasant 81 year-old white female with a past history  significant for coronary artery disease status post coronary artery bypass grafting,  valvular heart disease status post mitral valve and tricuspid valve repairs, and PFO  closure who presents for followup. The patient was originally admitted on 2011 with  a TIA. At that time, she was experiencing left arm paraesthesias, as well as facial  numbness. As part of her workup, she had a transesophageal echocardiogram on  2011. 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 There was  also significant valvular heart disease with severe mitral regurgitation and  mild-to-moderate tricuspid regurgitation. She followed up on this in the office for the  mitral regurgitation, and an exercise treadmill stress test was obtained on 11/3/2011 to  assess her functional capacity. She did manage to exercise for 5 minutes 35 seconds,  although she developed severe diffuse ST-T wave changes during the test, diagnostic for  ischemia. She then underwent a right and left heart catheterization on 2011 by Dr. Steve Black. She did have significant coronary artery disease in the LAD and left  circumflex systems. She then underwent a mitral valve repair, tricuspid valve repair, PFO   closure, and three vessel coronary artery bypass grafting operation on 2011 by Dr. Montana Saini at [facility].  She did develop some postoperative atrial fibrillation,   although this converted with IV amiodarone.       The patient developed tachycardia at one of her rehab sessions on 2/29/2012 at [facility]. Two subsequent EKGs determined this to be atrial flutter. The patient  converted back to normal sinus rhythm on her own. She then was placed back on amio  as well as the Pradaxa. She had a recurrence of atrial flutter on 8/31/2012 and was  admitted at that time. The patient underwent a cavotricuspid isthmus ablation by Dr. Beto Lea at [facility] on 11/08/2012. However, she again presented to her clinical  appointment on 11/26/2012 with recurrent tachycardia into the 120s. She subsequently  underwent a repeat electrophysiology study on 11/27/2012, which showed an ectopic right  atrial tachycardia, which was successfully ablated as well.  She does have a baseline   right bundle branch block and left posterior fascicular block as well.        The patient presents today for follow-up.  She has been doing well we will from a cardiac       standpoint, although she has had several noncardiac issues.  She has had left plantar       fasciitis, which has been painful.  She also had a substantial basal cell carcinoma on her       scalp which had to be removed, and is now in the phases of secondary wound healing.        She also had 4 family deaths in January 2022, which obviously caused a substantial       amount of stress for her.  He has not had any chest pain or shortness of breath.    Past Medical History:   Diagnosis Date   • Allergic rhinitis    • Atherosclerosis of aorta (HCC)     Severe plaques in the descending aorta and aortic arch by ROSINA on 10/4/12   • Atrial fibrillation (HCC)     Post-op after CABG in 12/11    • Atrial flutter (HCC)     s/p cavotricuspid isthmus ablation on 11/8/12 by Dr. Beto Lea at [facility].     • Bifascicular bundle branch block     RBBB and left posterior hemiblock    •  Carotid artery plaque     CTA of head and neck in 9/11 showed moderate calcification at the right carotid bulb causing less than 50% narrowing    • Coronary artery disease     s/p CABG    • Depression with anxiety    • History of atrial flutter     s/p cavotricuspid isthmus ablation on 11/8/12 by Dr Lea at [facility]     • Hypertension    • Mitral valve insufficiency     Moderate residual MR through the repair by ROSINA on 10/4/12. Mild MR by echo on 2/6/14 and 6/30/16.   • MR (mitral regurgitation)    • PAT (paroxysmal atrial tachycardia) (HCC)     Ectopic right atrial tachycardia, s/p ablation by Dr. Lea at [facility] on 11/27/12.    • Statin intolerance     Severe myalgias with Crestor, Lipitor, and pravastatin    • Transient cerebral ischemia     Two TIA's in 2011        Past Surgical History:   Procedure Laterality Date   • CARPAL TUNNEL RELEASE      Neurplasty decompression Median Nerve at Carpal Tunnel    • CORONARY ARTERY BYPASS GRAFT      3 Vessel CABG, MV repair, TV repair, and PFO closure on 12/22/2011 by Dr. Saini at [facility]. LIMA to the LAD, SVG-D2, and SVG-OM2.   • MITRAL VALVE REPAIR/REPLACEMENT     • PATENT FORAMEN OVALE CLOSURE     • TONSILLECTOMY     • TOTAL ABDOMINAL HYSTERECTOMY      2000    • TRICUSPID VALVE SURGERY         Current Outpatient Medications on File Prior to Visit   Medication Sig Dispense Refill   • aspirin 325 MG tablet Take 325 mg by mouth Daily.     • Cyanocobalamin (VITAMIN B12 PO) Take  by mouth Daily.     • Diclofenac Sodium (VOLTAREN) 1 % gel gel      • Flaxseed, Linseed, (FLAXSEED OIL PO) Take  by mouth Daily.     • metoprolol tartrate (LOPRESSOR) 50 MG tablet TAKE ONE TABLET BY MOUTH TWICE A  tablet 2   • Omega 3 1000 MG capsule Take 1 capsule by mouth Daily.     • vitamin B-6 (PYRIDOXINE) 50 MG tablet Take 50 mg by mouth Daily.       No current facility-administered medications on file prior to visit.     Allergies as of 05/20/2022 - Reviewed  "2022   Allergen Reaction Noted   • Atorvastatin  2016   • Codeine  2016   • Rosuvastatin Myalgia 2016   • Sulfa antibiotics  2016     Social History     Socioeconomic History   • Marital status:    Tobacco Use   • Smoking status: Never Smoker   • Smokeless tobacco: Never Used   Vaping Use   • Vaping Use: Never used   Substance and Sexual Activity   • Alcohol use: Yes     Comment: Rarely    • Drug use: No     Family History   Problem Relation Age of Onset   • Stroke Mother 87         from CVA    • Coronary artery disease Father 72        Fatal MI at age 72    • Coronary artery disease Sister         CABG and stenting    • Coronary artery disease Brother 71        Fatal MI at age 71       Review of Systems   Constitutional: Positive for malaise/fatigue.   Musculoskeletal: Positive for joint pain and muscle cramps.   Neurological: Positive for light-headedness.   All other systems reviewed and are negative.     Objective:     Vitals:    22 1406   BP: 136/80   BP Location: Left arm   Patient Position: Sitting   Cuff Size: Adult   Pulse: 84   Resp: 16   SpO2: 98%   Weight: 67.1 kg (148 lb)   Height: 172.7 cm (68\")     Body mass index is 22.5 kg/m².    Physical Exam  Constitutional:       Appearance: She is well-developed.   HENT:      Head: Normocephalic and atraumatic.   Eyes:      Conjunctiva/sclera: Conjunctivae normal.   Cardiovascular:      Rate and Rhythm: Normal rate and regular rhythm.      Heart sounds: Murmur heard.    Systolic murmur is present with a grade of 2/6 at the lower left sternal border.    No friction rub. No gallop.   Pulmonary:      Effort: Pulmonary effort is normal.      Breath sounds: Normal breath sounds.   Abdominal:      Palpations: Abdomen is soft.      Tenderness: There is no abdominal tenderness.   Musculoskeletal:      Cervical back: Neck supple.   Skin:     General: Skin is warm.   Neurological:      Mental Status: She is alert and " oriented to person, place, and time.   Psychiatric:         Behavior: Behavior normal.       Lab Review:   Procedures    Cardiac Procedures:  1. Status post mitral valve repair with a 24 mm [employer] physio II  annuloplasty ring, tricuspid valve repair with a 26 mm [employer] MC3   annuloplasty ring, PFO closure and three vessel coronary artery bypass grafting by   Dr. Montana Saini on 12/22/2011. Her bypass anatomy included a LIMA to the LAD,   saphenous vein graft to second diagonal, and saphenous vein graft to second   obtuse marginal branch.   2. Transesophageal echocardiogram on 10/04/2012 revealed a normal ejection   fraction of 60-65%. There was moderate residual mitral regurgitation through the   ring.   3. Status post cavotricuspid isthmus ablation on 11/08/2012 by Dr. Beto Lea at   [facility] for atrial flutter.   4. Status post ectopic right atrial tachycardia ablation by Dr. Beto Lea on   11/27/2012.   5. Echo 6/30/2016: Ejection fraction of 60%, mildly dilated left atrium, mild mitral   regurgitation through the annuloplasty ring, elevated gradients across the mitral   annuloplasty ring with a peak gradient of 15 mmHg and a mean gradient of 7   mmHg, and trace tricuspid regurgitation through the annuloplasty ring.  6. Echocardiogram on 1/22/2018: The ejection fraction was 62%.  There was mild   LVH.  The left atrium was mildly dilated.  There was mild regurgitation through the   mitral annuloplasty ring.  Gradients across mitral valve annuloplasty were elevated   with a peak of 18 mmHg, and a mean of 10 mmHg.  There was trace tricuspid   regurgitation through the annuloplasty ring.  7.  Echocardiogram on 6/15/2020: The ejection fraction was 60%. There was mild   LVH.  The left atrium was mildly enlarged.  Gradients across the mitral annuloplasty   were elevated with a peak of 19 mmHg and a mean of 7 mmHg.  There was mild   tricuspid regurgitation through the annuloplasty  ring.  The calculated RVSP was 40   mmHg.    Assessment:       Diagnosis Plan   1. Coronary artery disease involving coronary bypass graft of native heart without angina pectoris  Adult Transthoracic Echo Complete w/ Color, Spectral and Contrast if Necessary Per Protocol   2. History of mitral valve repair  Adult Transthoracic Echo Complete w/ Color, Spectral and Contrast if Necessary Per Protocol   3. History of tricuspid valve repair  Adult Transthoracic Echo Complete w/ Color, Spectral and Contrast if Necessary Per Protocol   4. Status post ablation of atrial flutter     5. Bifascicular block       Plan:     Again, she has had multiple noncardiac issues as described above.  She has had a large basal cell carcinoma removed from her scalp which is still healing, for family deaths in January 2022, and left plantar fasciitis.  From a cardiac standpoint, she seems to be completely stable.  She will continue on the aspirin at 81 mg/day given her history of coronary artery disease.  She will continue on the metoprolol at 50 mg twice a day.  She has not had any recent arrhythmias.  I am going to recheck an echocardiogram at this time to reevaluate the valve repairs.  She has been intolerant to multiple statin medications in the past, and she does not qualify for Repatha or Praluent.  I will have her follow-up in 6 months, and the echocardiogram will be arranged.

## 2022-08-19 ENCOUNTER — OFFICE VISIT (OUTPATIENT)
Dept: CARDIOLOGY | Facility: CLINIC | Age: 82
End: 2022-08-19

## 2022-08-19 ENCOUNTER — HOSPITAL ENCOUNTER (OUTPATIENT)
Dept: CARDIOLOGY | Facility: HOSPITAL | Age: 82
Discharge: HOME | End: 2022-08-19
Admitting: INTERNAL MEDICINE

## 2022-08-19 VITALS
DIASTOLIC BLOOD PRESSURE: 80 MMHG | SYSTOLIC BLOOD PRESSURE: 136 MMHG | HEART RATE: 78 BPM | BODY MASS INDEX: 24.09 KG/M2 | WEIGHT: 149.91 LBS | HEIGHT: 66 IN

## 2022-08-19 VITALS
OXYGEN SATURATION: 96 % | HEART RATE: 81 BPM | BODY MASS INDEX: 23.78 KG/M2 | HEIGHT: 66 IN | DIASTOLIC BLOOD PRESSURE: 70 MMHG | WEIGHT: 148 LBS | SYSTOLIC BLOOD PRESSURE: 122 MMHG

## 2022-08-19 DIAGNOSIS — Z98.890 STATUS POST CATHETER ABLATION OF ATRIAL FLUTTER: ICD-10-CM

## 2022-08-19 DIAGNOSIS — Z98.890 STATUS POST TRICUSPID VALVE REPAIR: ICD-10-CM

## 2022-08-19 DIAGNOSIS — Z98.890 HISTORY OF MITRAL VALVE REPAIR: ICD-10-CM

## 2022-08-19 DIAGNOSIS — Z98.890 HISTORY OF TRICUSPID VALVE REPAIR: ICD-10-CM

## 2022-08-19 DIAGNOSIS — I47.1 ATRIAL TACHYCARDIA: ICD-10-CM

## 2022-08-19 DIAGNOSIS — I25.810 CORONARY ARTERY DISEASE INVOLVING CORONARY BYPASS GRAFT OF NATIVE HEART WITHOUT ANGINA PECTORIS: Primary | ICD-10-CM

## 2022-08-19 DIAGNOSIS — I45.2 BIFASCICULAR BLOCK: ICD-10-CM

## 2022-08-19 DIAGNOSIS — I25.810 CORONARY ARTERY DISEASE INVOLVING CORONARY BYPASS GRAFT OF NATIVE HEART WITHOUT ANGINA PECTORIS: ICD-10-CM

## 2022-08-19 LAB
AORTIC ARCH: 1.9 CM
ASCENDING AORTA: 2.3 CM
BH CV ECHO MEAS - ACS: 1.79 CM
BH CV ECHO MEAS - AO MAX PG: 7.5 MMHG
BH CV ECHO MEAS - AO MEAN PG: 4.3 MMHG
BH CV ECHO MEAS - AO ROOT DIAM: 2.7 CM
BH CV ECHO MEAS - AO V2 MAX: 137.1 CM/SEC
BH CV ECHO MEAS - AO V2 VTI: 32.2 CM
BH CV ECHO MEAS - AVA(I,D): 1.92 CM2
BH CV ECHO MEAS - EDV(CUBED): 55.8 ML
BH CV ECHO MEAS - EDV(MOD-SP2): 52 ML
BH CV ECHO MEAS - EDV(MOD-SP4): 47 ML
BH CV ECHO MEAS - EF(MOD-BP): 62 %
BH CV ECHO MEAS - EF(MOD-SP2): 63.5 %
BH CV ECHO MEAS - EF(MOD-SP4): 61.7 %
BH CV ECHO MEAS - ESV(CUBED): 14 ML
BH CV ECHO MEAS - ESV(MOD-SP2): 19 ML
BH CV ECHO MEAS - ESV(MOD-SP4): 18 ML
BH CV ECHO MEAS - FS: 36.9 %
BH CV ECHO MEAS - IVS/LVPW: 0.89 CM
BH CV ECHO MEAS - IVSD: 0.88 CM
BH CV ECHO MEAS - LAT PEAK E' VEL: 4.7 CM/SEC
BH CV ECHO MEAS - LV DIASTOLIC VOL/BSA (35-75): 26.1 CM2
BH CV ECHO MEAS - LV MASS(C)D: 107.6 GRAMS
BH CV ECHO MEAS - LV MAX PG: 2.15 MMHG
BH CV ECHO MEAS - LV MEAN PG: 1.57 MMHG
BH CV ECHO MEAS - LV SYSTOLIC VOL/BSA (12-30): 10 CM2
BH CV ECHO MEAS - LV V1 MAX: 73.3 CM/SEC
BH CV ECHO MEAS - LV V1 VTI: 19.2 CM
BH CV ECHO MEAS - LVIDD: 3.8 CM
BH CV ECHO MEAS - LVIDS: 2.41 CM
BH CV ECHO MEAS - LVOT AREA: 3.2 CM2
BH CV ECHO MEAS - LVOT DIAM: 2.03 CM
BH CV ECHO MEAS - LVPWD: 0.99 CM
BH CV ECHO MEAS - MED PEAK E' VEL: 3.7 CM/SEC
BH CV ECHO MEAS - MR MAX PG: 76.4 MMHG
BH CV ECHO MEAS - MR MAX VEL: 437.1 CM/SEC
BH CV ECHO MEAS - MV A DUR: 0.11 SEC
BH CV ECHO MEAS - MV A MAX VEL: 102.1 CM/SEC
BH CV ECHO MEAS - MV DEC SLOPE: 639.4 CM/SEC2
BH CV ECHO MEAS - MV DEC TIME: 0.23 MSEC
BH CV ECHO MEAS - MV E MAX VEL: 156 CM/SEC
BH CV ECHO MEAS - MV E/A: 1.53
BH CV ECHO MEAS - MV MAX PG: 19.6 MMHG
BH CV ECHO MEAS - MV MEAN PG: 9.4 MMHG
BH CV ECHO MEAS - MV P1/2T: 94.5 MSEC
BH CV ECHO MEAS - MV V2 VTI: 63.6 CM
BH CV ECHO MEAS - MVA(P1/2T): 2.33 CM2
BH CV ECHO MEAS - MVA(VTI): 0.98 CM2
BH CV ECHO MEAS - PA ACC TIME: 0.09 SEC
BH CV ECHO MEAS - PA PR(ACCEL): 39.8 MMHG
BH CV ECHO MEAS - PA V2 MAX: 69.2 CM/SEC
BH CV ECHO MEAS - PULM A REVS DUR: 0.08 SEC
BH CV ECHO MEAS - PULM A REVS VEL: 19.5 CM/SEC
BH CV ECHO MEAS - PULM DIAS VEL: 36.5 CM/SEC
BH CV ECHO MEAS - PULM S/D: 0.73
BH CV ECHO MEAS - PULM SYS VEL: 26.7 CM/SEC
BH CV ECHO MEAS - QP/QS: 0.42
BH CV ECHO MEAS - RAP SYSTOLE: 3 MMHG
BH CV ECHO MEAS - RV MAX PG: 1.57 MMHG
BH CV ECHO MEAS - RV V1 MAX: 62.6 CM/SEC
BH CV ECHO MEAS - RV V1 VTI: 10 CM
BH CV ECHO MEAS - RVOT DIAM: 1.82 CM
BH CV ECHO MEAS - RVSP: 36 MMHG
BH CV ECHO MEAS - SI(MOD-SP2): 18.3 ML/M2
BH CV ECHO MEAS - SI(MOD-SP4): 16.1 ML/M2
BH CV ECHO MEAS - SUP REN AO DIAM: 1.6 CM
BH CV ECHO MEAS - SV(LVOT): 62 ML
BH CV ECHO MEAS - SV(MOD-SP2): 33 ML
BH CV ECHO MEAS - SV(MOD-SP4): 29 ML
BH CV ECHO MEAS - SV(RVOT): 26 ML
BH CV ECHO MEAS - TAPSE (>1.6): 1.51 CM
BH CV ECHO MEAS - TR MAX PG: 33.4 MMHG
BH CV ECHO MEAS - TR MAX VEL: 289.1 CM/SEC
BH CV ECHO MEASUREMENTS AVERAGE E/E' RATIO: 37.14
BH CV XLRA - RV BASE: 2.23 CM
BH CV XLRA - RV LENGTH: 7.2 CM
BH CV XLRA - RV MID: 2.07 CM
BH CV XLRA - TDI S': 8.9 CM/SEC
LEFT ATRIUM VOLUME INDEX: 23.5 ML/M2
MAXIMAL PREDICTED HEART RATE: 137 BPM
SINUS: 2.6 CM
STJ: 2.38 CM
STRESS TARGET HR: 116 BPM

## 2022-08-19 PROCEDURE — 99213 OFFICE O/P EST LOW 20 MIN: CPT | Performed by: INTERNAL MEDICINE

## 2022-08-19 PROCEDURE — 93306 TTE W/DOPPLER COMPLETE: CPT

## 2022-08-19 PROCEDURE — 93306 TTE W/DOPPLER COMPLETE: CPT | Performed by: INTERNAL MEDICINE

## 2022-08-19 NOTE — PROGRESS NOTES
I went over the results with her in the office today.  She has elevated gradients across her mitral annuloplasty, although no new symptoms, and this has been fairly chronic.    FLORES

## 2022-08-20 NOTE — PROGRESS NOTES
Date of Office Visit: 2022  Encounter Provider: Jerzy Burleson MD  Place of Service: [facility] CARDIOLOGY  Patient Name: Yun Han  :1940    Chief complaint: Follow-up for coronary artery disease, mitral valve repair, tricuspid  valve repair, atrial flutter s/p ablation, atrial tachycardia s/p ablation, and bifascicular   block.     History of Present Illness:    Dear Dr. Elizabeth:       I again had the pleasure of seeing your patient in cardiology office on 2022.  As  you well know, she is a very pleasant 81 year-old white female with a past history  significant for coronary artery disease status post coronary artery bypass grafting,  valvular heart disease status post mitral valve and tricuspid valve repairs, and PFO  closure who presents for followup. The patient was originally admitted on 2011 with  a TIA. At that time, she was experiencing left arm paraesthesias, as well as facial  numbness. As part of her workup, she had a transesophageal echocardiogram on  2011. 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 There was  also significant valvular heart disease with severe mitral regurgitation and  mild-to-moderate tricuspid regurgitation. She followed up on this in the office for the  mitral regurgitation, and an exercise treadmill stress test was obtained on 11/3/2011 to  assess her functional capacity. She did manage to exercise for 5 minutes 35 seconds,  although she developed severe diffuse ST-T wave changes during the test, diagnostic for  ischemia. She then underwent a right and left heart catheterization on 2011 by Dr. Steve Black. She did have significant coronary artery disease in the LAD and left  circumflex systems. She then underwent a mitral valve repair, tricuspid valve repair, PFO   closure, and three vessel coronary artery bypass grafting operation on 2011 by Dr. Montana Saini at [facility].  She did develop some postoperative atrial fibrillation,   although this converted with IV amiodarone.       The patient developed tachycardia at one of her rehab sessions on 2/29/2012 at [facility]. Two subsequent EKGs determined this to be atrial flutter. The patient  converted back to normal sinus rhythm on her own. She then was placed back on amio  as well as the Pradaxa. She had a recurrence of atrial flutter on 8/31/2012 and was  admitted at that time. The patient underwent a cavotricuspid isthmus ablation by Dr. Beto Lea at [facility] on 11/08/2012. However, she again presented to her clinical  appointment on 11/26/2012 with recurrent tachycardia into the 120s. She subsequently  underwent a repeat electrophysiology study on 11/27/2012, which showed an ectopic right  atrial tachycardia, which was successfully ablated as well.  She does have a baseline   right bundle branch block and left posterior fascicular block as well.        The patient presents today for follow-up.  She has not had any significant chest       discomfort or shortness of breath.  She still has the same lightheaded symptoms that she       had previously, and she is fatigued.  However, these have been longstanding issues.  Her       blood pressure is excellent at 122/70.  She did have an echocardiogram from earlier today,       which is discussed below.    Past Medical History:   Diagnosis Date   • Allergic rhinitis    • Atherosclerosis of aorta (HCC)     Severe plaques in the descending aorta and aortic arch by ROSINA on 10/4/12   • Atrial fibrillation (HCC)     Post-op after CABG in 12/11    • Atrial flutter (HCC)     s/p cavotricuspid isthmus ablation on 11/8/12 by Dr. Beto Lea at [facility].     • Bifascicular bundle branch block     RBBB and left posterior hemiblock    • Carotid artery plaque     CTA of head and neck in 9/11 showed moderate calcification at the right carotid bulb causing less than 50% narrowing     • Coronary artery disease     s/p CABG    • Depression with anxiety    • History of atrial flutter     s/p cavotricuspid isthmus ablation on 11/8/12 by Dr Lea at [facility]     • Hypertension    • Mitral valve insufficiency     Moderate residual MR through the repair by ROSINA on 10/4/12. Mild MR by echo on 2/6/14 and 6/30/16.   • MR (mitral regurgitation)    • PAT (paroxysmal atrial tachycardia) (HCC)     Ectopic right atrial tachycardia, s/p ablation by Dr. Lea at [facility] on 11/27/12.    • Statin intolerance     Severe myalgias with Crestor, Lipitor, and pravastatin    • Transient cerebral ischemia     Two TIA's in 2011        Past Surgical History:   Procedure Laterality Date   • CARPAL TUNNEL RELEASE      Neurplasty decompression Median Nerve at Carpal Tunnel    • CORONARY ARTERY BYPASS GRAFT      3 Vessel CABG, MV repair, TV repair, and PFO closure on 12/22/2011 by Dr. Saini at [facility]. LIMA to the LAD, SVG-D2, and SVG-OM2.   • MITRAL VALVE REPAIR/REPLACEMENT     • PATENT FORAMEN OVALE CLOSURE     • TONSILLECTOMY     • TOTAL ABDOMINAL HYSTERECTOMY      2000    • TRICUSPID VALVE SURGERY         Current Outpatient Medications on File Prior to Visit   Medication Sig Dispense Refill   • aspirin 325 MG tablet Take 325 mg by mouth Daily.     • Cyanocobalamin (VITAMIN B12 PO) Take  by mouth Daily.     • Diclofenac Sodium (VOLTAREN) 1 % gel gel      • Flaxseed, Linseed, (FLAXSEED OIL PO) Take  by mouth Daily.     • metoprolol tartrate (LOPRESSOR) 50 MG tablet TAKE ONE TABLET BY MOUTH TWICE A  tablet 2   • Omega 3 1000 MG capsule Take 1 capsule by mouth Daily.     • vitamin B-6 (PYRIDOXINE) 50 MG tablet Take 50 mg by mouth Daily.       No current facility-administered medications on file prior to visit.     Allergies as of 08/19/2022 - Reviewed 08/19/2022   Allergen Reaction Noted   • Atorvastatin  06/06/2016   • Codeine  06/06/2016   • Rosuvastatin Myalgia 06/06/2016   • Sulfa antibiotics   "2016     Social History     Socioeconomic History   • Marital status:    Tobacco Use   • Smoking status: Never Smoker   • Smokeless tobacco: Never Used   Vaping Use   • Vaping Use: Never used   Substance and Sexual Activity   • Alcohol use: Yes     Comment: Rarely    • Drug use: No     Family History   Problem Relation Age of Onset   • Stroke Mother 87         from CVA    • Coronary artery disease Father 72        Fatal MI at age 72    • Coronary artery disease Sister         CABG and stenting    • Coronary artery disease Brother 71        Fatal MI at age 71       Review of Systems   Constitutional: Positive for malaise/fatigue.   Musculoskeletal: Positive for joint pain.   Neurological: Positive for light-headedness.   All other systems reviewed and are negative.     Objective:     Vitals:    22 1301   BP: 122/70   Pulse: 81   SpO2: 96%   Weight: 67.1 kg (148 lb)   Height: 168 cm (66.14\")     Body mass index is 23.79 kg/m².    Physical Exam  Constitutional:       Appearance: She is well-developed.   HENT:      Head: Normocephalic and atraumatic.   Eyes:      Conjunctiva/sclera: Conjunctivae normal.   Cardiovascular:      Rate and Rhythm: Normal rate and regular rhythm.      Heart sounds: Murmur heard.    Systolic murmur is present with a grade of 2/6 at the lower left sternal border.    No friction rub. No gallop.   Pulmonary:      Effort: Pulmonary effort is normal.      Breath sounds: Normal breath sounds.   Abdominal:      Palpations: Abdomen is soft.      Tenderness: There is no abdominal tenderness.   Musculoskeletal:      Cervical back: Neck supple.   Skin:     General: Skin is warm.   Neurological:      Mental Status: She is alert and oriented to person, place, and time.   Psychiatric:         Behavior: Behavior normal.       Lab Review:   Procedures    Cardiac Procedures:  1. Status post mitral valve repair with a 24 mm [employer] physio II  annuloplasty ring, tricuspid " valve repair with a 26 mm [employer] MC3   annuloplasty ring, PFO closure and three vessel coronary artery bypass grafting by   Dr. Montana Saini on 12/22/2011. Her bypass anatomy included a LIMA to the LAD,   saphenous vein graft to second diagonal, and saphenous vein graft to second   obtuse marginal branch.   2. Transesophageal echocardiogram on 10/04/2012 revealed a normal ejection   fraction of 60-65%. There was moderate residual mitral regurgitation through the   ring.   3. Status post cavotricuspid isthmus ablation on 11/08/2012 by Dr. Beto Lea at   [facility] for atrial flutter.   4. Status post ectopic right atrial tachycardia ablation by Dr. Beto Lea on   11/27/2012.   5. Echo 6/30/2016: Ejection fraction of 60%, mildly dilated left atrium, mild mitral   regurgitation through the annuloplasty ring, elevated gradients across the mitral   annuloplasty ring with a peak gradient of 15 mmHg and a mean gradient of 7   mmHg, and trace tricuspid regurgitation through the annuloplasty ring.  6. Echocardiogram on 1/22/2018: The ejection fraction was 62%.  There was mild   LVH.  The left atrium was mildly dilated.  There was mild regurgitation through the   mitral annuloplasty ring.  Gradients across mitral valve annuloplasty were elevated   with a peak of 18 mmHg, and a mean of 10 mmHg.  There was trace tricuspid   regurgitation through the annuloplasty ring.  7.  Echocardiogram on 6/15/2020: The ejection fraction was 60%. There was mild   LVH.  The left atrium was mildly enlarged.  Gradients across the mitral annuloplasty   were elevated with a peak of 19 mmHg and a mean of 7 mmHg.  There was mild   tricuspid regurgitation through the annuloplasty ring.  The calculated RVSP was 40   mHg.  8.  Echocardiogram on 8/19/2022: The ejection fraction was 60 to 65%.  There was   mild LVH.  The right ventricle was normal.  The left atrium was mildly enlarged.    The aortic leaflets were mildly calcified.   Gradients across the mitral annuloplasty   were elevated with a peak of 19 and a mean of 9.  There was mild mitral   regurgitation.  There was mild tricuspid regurgitation.    Assessment:       Diagnosis Plan   1. Coronary artery disease involving coronary bypass graft of native heart without angina pectoris     2. Status post tricuspid valve repair     3. Status post catheter ablation of atrial flutter     4. Atrial tachycardia (HCC)     5. Bifascicular block       Plan:     From a cardiac standpoint, she seems to be stable.  She will continue on the aspirin at 81 mg/day given her history of coronary artery disease.  She will continue on the metoprolol at 50 mg twice a day.  She has not had any recent arrhythmias.  She has been intolerant to multiple statin medications in the past, and she has not qualified for Repatha or Praluent in the past.  Her echocardiogram from earlier today again showed elevated gradients across the mitral annuloplasty, although this has been longstanding.  In looking at her last several echocardiograms, there is not much difference.  The valve still appears to open well, and there is only mild regurgitation.      I did not change any medications today.  I will have her follow-up in 6 months.

## 2022-09-27 RX ORDER — METOPROLOL TARTRATE 50 MG/1
50 TABLET, FILM COATED ORAL 2 TIMES DAILY
Qty: 180 TABLET | Refills: 2 | Status: SHIPPED | OUTPATIENT
Start: 2022-09-27

## 2023-04-28 NOTE — PROGRESS NOTES
Date of Office Visit:  6/15/2020  Encounter Provider: Jerzy Burleson MD  Place of Service: [facility] CARDIOLOGY  Patient Name: Yun Han  :1940    Chief complaint: Follow-up for coronary artery disease, mitral valve repair, tricuspid  valve repair, atrial flutter s/p ablation, and atrial tachycardia s/p ablation.     History of Present Illness:    Dear Dr. Elizabeth:       I again had the pleasure of seeing your patient in cardiology office on 6/15/2020. As  you well know, she is a very pleasant 79 year-old white female with a past history  significant for coronary artery disease status post coronary artery bypass grafting,  valvular heart disease status post mitral valve and tricuspid valve repairs, and PFO  closure who presents for followup. The patient was originally admitted on 2011 with  a TIA. At that time, she was experiencing left arm paraesthesias, as well as facial  numbness. As part of her workup, she had a transesophageal echocardiogram on  2011. 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 There was  also significant valvular heart disease with severe mitral regurgitation and  mild-to-moderate tricuspid regurgitation. She followed up on this in the office for the  mitral regurgitation, and an exercise treadmill stress test was obtained on 11/3/2011 to  assess her functional capacity. She did manage to exercise for 5 minutes 35 seconds,  although she developed severe diffuse ST-T wave changes during the test, diagnostic for  ischemia. She then underwent a right and left heart catheterization on 2011 by Dr. Steve Black. She did have significant coronary artery disease in the LAD and left  circumflex systems. She then underwent a mitral valve repair, tricuspid valve repair, PFO   closure, and three vessel coronary artery bypass grafting operation on 2011 by Dr. Montana Saini at [facility]. She did develop some  postoperative atrial fibrillation,   although this converted with IV amiodarone.       The patient developed tachycardia at one of her rehab sessions on 2/29/2012 at [facility]. Two subsequent EKGs determined this to be atrial flutter. The patient  converted back to normal sinus rhythm on her own. She then was placed back on amio  as well as the Pradaxa. She had a recurrence of atrial flutter on 8/31/2012 and was  admitted at that time. The patient underwent a cavotricuspid isthmus ablation by Dr. Beto Lea at [facility] on 11/08/2012. However, she again presented to her clinical  appointment on 11/26/2012 with recurrent tachycardia into the 120s. She subsequently  underwent a repeat electrophysiology study on 11/27/2012, which showed an ectopic right  atrial tachycardia, which was successfully ablated as well.       The patient presents today for follow-up.  She is doing well.  She really has not had any   exertional symptoms such as chest pain or shortness of breath.  She did recently stepped   on a sewing needle which pierced her right foot.  She has been on antibiotics for this.  She   did get some lightheadedness after taking Cipro.  She had an echocardiogram today,   which was unchanged from her prior study.    Past Medical History:   Diagnosis Date   • Allergic rhinitis    • Atherosclerosis of aorta (CMS/HCC)     Severe plaques in the descending aorta and aortic arch by ROSINA on 10/4/12   • Atrial fibrillation (CMS/HCC)     Post-op after CABG in 12/11    • Atrial flutter (CMS/HCC)     s/p cavotricuspid isthmus ablation on 11/8/12 by Dr. Beto Lea at [facility].     • Bifascicular bundle branch block     RBBB and left posterior hemiblock    • Carotid artery plaque     CTA of head and neck in 9/11 showed moderate calcification at the right carotid bulb causing less than 50% narrowing    • Coronary artery disease     s/p CABG    • Depression with anxiety    • History of atrial flutter      s/p cavotricuspid isthmus ablation on 11/8/12 by Dr Lea at [facility]     • Hypertension    • Mitral valve insufficiency     Moderate residual MR through the repair by ROSINA on 10/4/12. Mild MR by echo on 2/6/14 and 6/30/16.   • MR (mitral regurgitation)    • PAT (paroxysmal atrial tachycardia) (CMS/HCC)     Ectopic right atrial tachycardia, s/p ablation by Dr. Lea at [facility] on 11/27/12.    • Statin intolerance     Severe myalgias with Crestor, Lipitor, and pravastatin    • Transient cerebral ischemia     Two TIA's in 2011        Past Surgical History:   Procedure Laterality Date   • CARPAL TUNNEL RELEASE      Neurplasty decompression Median Nerve at Carpal Tunnel    • CORONARY ARTERY BYPASS GRAFT      3 Vessel CABG, MV repair, TV repair, and PFO closure on 12/22/2011 by Dr. Saini at [facility]. LIMA to the LAD, SVG-D2, and SVG-OM2.   • MITRAL VALVE REPAIR/REPLACEMENT     • PATENT FORAMEN OVALE CLOSURE     • TONSILLECTOMY     • TOTAL ABDOMINAL HYSTERECTOMY      2000    • TRICUSPID VALVE SURGERY         Current Outpatient Medications on File Prior to Visit   Medication Sig Dispense Refill   • aspirin 325 MG tablet Take 325 mg by mouth Daily.     • Cyanocobalamin (VITAMIN B12 PO) Take  by mouth Daily.     • Flaxseed, Linseed, (FLAXSEED OIL PO) Take  by mouth Daily.     • metoprolol tartrate (LOPRESSOR) 50 MG tablet TAKE ONE TABLET BY MOUTH TWICE A  tablet 3   • Multiple Vitamin tablet Take  by mouth daily.     • Omega 3 1000 MG capsule Take 1 capsule by mouth Daily.       No current facility-administered medications on file prior to visit.      Allergies as of 06/15/2020 - Reviewed 06/15/2020   Allergen Reaction Noted   • Atorvastatin  06/06/2016   • Codeine  06/06/2016   • Rosuvastatin Myalgia 06/06/2016   • Sulfa antibiotics  06/06/2016     Social History     Socioeconomic History   • Marital status:      Spouse name: Not on file   • Number of children: Not on file   • Years of  "education: Not on file   • Highest education level: Not on file   Tobacco Use   • Smoking status: Never Smoker   • Smokeless tobacco: Never Used   Substance and Sexual Activity   • Alcohol use: Yes     Comment: Rarely    • Drug use: No     Family History   Problem Relation Age of Onset   • Stroke Mother 87         from CVA    • Coronary artery disease Father 72        Fatal MI at age 72    • Coronary artery disease Sister         CABG and stenting    • Coronary artery disease Brother 71        Fatal MI at age 71       Review of Systems   Constitution: Positive for malaise/fatigue.   Neurological: Positive for light-headedness.   All other systems reviewed and are negative.     Objective:     Vitals:    06/15/20 1357   BP: 128/80   Pulse: 72   SpO2: 98%   Weight: 68.2 kg (150 lb 6.4 oz)   Height: 170.2 cm (67.01\")     Body mass index is 23.55 kg/m².    Physical Exam   Constitutional: She is oriented to person, place, and time. She appears well-developed and well-nourished.   HENT:   Head: Normocephalic and atraumatic.   Eyes: Conjunctivae are normal.   Neck: Neck supple.   Cardiovascular: Normal rate and regular rhythm. Exam reveals no gallop and no friction rub.   Murmur heard.   Systolic murmur is present with a grade of 2/6 at the lower left sternal border.  Pulmonary/Chest: Effort normal and breath sounds normal.   Abdominal: Soft. There is no tenderness.   Musculoskeletal: She exhibits no edema.   Neurological: She is alert and oriented to person, place, and time.   Skin: Skin is warm.   Psychiatric: She has a normal mood and affect. Her behavior is normal.     Lab Review:   Procedures    Cardiac Procedures:  1. Status post mitral valve repair with a 24 mm [employer] physio II  annuloplasty ring, tricuspid valve repair with a 26 mm [employer] MC3   annuloplasty ring, PFO closure and three vessel coronary artery bypass grafting by   Dr. Montana Saini on 2011. Her bypass anatomy " included a LIMA to the LAD,   saphenous vein graft to second diagonal, and saphenous vein graft to second   obtuse marginal branch.   2. Transesophageal echocardiogram on 10/04/2012 revealed a normal ejection   fraction of 60-65%. There was moderate residual mitral regurgitation through the   ring.   3. Status post cavotricuspid isthmus ablation on 11/08/2012 by Dr. Beto Lea at   [facility] for atrial flutter.   4. Status post ectopic right atrial tachycardia ablation by Dr. Beto Lea on   11/27/2012.   5. Echo 6/30/2016: Ejection fraction of 60%, mildly dilated left atrium, mild mitral   regurgitation through the annuloplasty ring, elevated gradients across the mitral   annuloplasty ring with a peak gradient of 15 mmHg and a mean gradient of 7   mmHg, and trace tricuspid regurgitation through the annuloplasty ring.  6. Echocardiogram on 1/22/2018: The ejection fraction was 62%.  There was mild   LVH.  The left atrium was mildly dilated.  There was mild regurgitation through the   mitral annuloplasty ring.  Gradients across mitral valve annuloplasty were elevated   with a peak of 18 mmHg, and a mean of 10 mmHg.  There was trace tricuspid   regurgitation through the annuloplasty ring.  7.  Echocardiogram on 6/15/2020: The ejection fraction was 60%. There was mild   LVH.  The left atrium was mildly enlarged.  Gradients across the mitral annuloplasty   were elevated with a peak of 19 mmHg and a mean of 7 mmHg.  There was mild   tricuspid regurgitation through the annuloplasty ring.  The calculated RVSP was 40   mmHg.    Assessment:       Diagnosis Plan   1. Coronary artery disease involving coronary bypass graft of native heart without angina pectoris     2. Status post mitral valve repair     3. Status post tricuspid valve repair     4. Status post catheter ablation of atrial flutter     5. History of atrial tachycardia       Plan:       Her echocardiogram from today again looks essentially the same.  Both valve  repairs looked good.  The gradients across the mitral valve repair are elevated, although they are actually better than the last echo with a mean gradient of 7 mmHg.  She is completely asymptomatic from a cardiac standpoint.  She will continue on the aspirin and metoprolol.  She has been intolerant to multiple statin medications.  Her lipid panel has been too low to qualify for Repatha or Praluent.  I will see her back in 6 months.         hx NHL 2010

## 2024-04-03 ENCOUNTER — OFFICE VISIT (OUTPATIENT)
Dept: CARDIOLOGY | Facility: CLINIC | Age: 84
End: 2024-04-03
Payer: MEDICARE

## 2024-04-03 VITALS
HEART RATE: 77 BPM | SYSTOLIC BLOOD PRESSURE: 128 MMHG | DIASTOLIC BLOOD PRESSURE: 80 MMHG | WEIGHT: 138 LBS | HEIGHT: 66 IN | BODY MASS INDEX: 22.18 KG/M2

## 2024-04-03 DIAGNOSIS — Z98.890 S/P MITRAL VALVE REPAIR: ICD-10-CM

## 2024-04-03 DIAGNOSIS — Z98.890 HX OF TRICUSPID VALVE REPAIR: ICD-10-CM

## 2024-04-03 DIAGNOSIS — I25.10 CORONARY ARTERY DISEASE INVOLVING NATIVE CORONARY ARTERY OF NATIVE HEART WITHOUT ANGINA PECTORIS: Primary | ICD-10-CM

## 2024-04-03 DIAGNOSIS — E04.9 GOITER: ICD-10-CM

## 2024-04-03 PROCEDURE — 93000 ELECTROCARDIOGRAM COMPLETE: CPT | Performed by: INTERNAL MEDICINE

## 2024-04-03 PROCEDURE — 99214 OFFICE O/P EST MOD 30 MIN: CPT | Performed by: INTERNAL MEDICINE

## 2024-04-03 NOTE — PROGRESS NOTES
Date of Office Visit: 2024  Encounter Provider: Shanice Aquino MD  Place of Service: [facility] CARDIOLOGY  Patient Name: Yun Han  :1940      Patient ID:  Yun Han is a 83 y.o. female is here for  followup for CAD, valve repairs.         History of Present Illness    She has a past history of coronary artery disease- s/p CABG, s/p mitral valve and tricuspid valve repairs, and PFO  Closure in , history of TIA, h/o atrial flutter s/p ablation in , history of ectopic right atrial tachycardia status post ablation in , RBBB, LPFB, hyperlipidemia, hypertension.      Her dad had myocardial infarction, sister and brother had myocardial infarction, mom and dad had hypertension and mom had strokes.  Dad also had diabetes as well as sister and brother.  She is , has 3 children, is retired, has rare alcohol, no smoking in 2 cups of coffee per day.    In 2011 she presented with a TIA symptomatic of facial numbness and left arm paresthesia.  She had a transesophageal echocardiogram on  2011. 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 There was  also significant valvular heart disease with severe mitral regurgitation and  mild-to-moderate tricuspid regurgitation. She had an exercise treadmill stress test was obtained on 11/3/2011 to  assess her functional capacity. She did manage to exercise for 5 minutes 35 seconds,  although she developed severe diffuse ST-T wave changes during the test, diagnostic for  ischemia. She then underwent a right and left heart catheterization on 2011 by Dr. Steve Black. She did have significant coronary artery disease in the LAD and left  circumflex systems. She then underwent a mitral valve repair, tricuspid valve repair, PFO   closure, and three vessel coronary artery bypass grafting operation on 2011 by Dr. Montana Saini at [facility].       The patient developed  tachycardia at one of her rehab sessions on 2/29/2012 at [facility]. Two subsequent EKGs determined this to be atrial flutter. The patient  converted back to normal sinus rhythm on her own. She then was placed back on amio  as well as the Pradaxa. She had a recurrence of atrial flutter on 8/31/2012 and was  admitted at that time. The patient underwent a cavotricuspid isthmus ablation by Dr. Beto Lea at [facility] on 11/08/2012. However, she again presented to her clinical  appointment on 11/26/2012 with recurrent tachycardia into the 120s. She subsequently  underwent a repeat electrophysiology study on 11/27/2012, which showed an ectopic right  atrial tachycardia, which was successfully ablated as well.  She does have a baseline   right bundle branch block and left posterior fascicular block as well.        Labs on 9/15/2023 show normal CBC, glucose 160 with otherwise normal CMP.  Labs done 5/2023 show normal vitamin D level, hemoglobin A1c 6 5.7%, triglycerides 123, total cholesterol 203, HDL 66, , normal TSH, unremarkable CMP.      She had strokelike symptoms 9/2023-was seen at [facility].  Echocardiogram done 9/16/2023 showed ejection fraction 59% with annuloplasty ring in the mitral position, no insufficiency, mild stenosis, normal right ventricular size and function, normal aortic valve, moderate left atrial enlargement, tricuspid valve was not commented on.  MRI brain done 9/15/2023 showed no acute ischemia.  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    She has no chest pain or pressure.  She does have numbness in her feet.  She has some dizziness due to vertigo.  She has had no syncope or falls.   She has no orthopnea or PND.  She has no exertional dyspnea.  She does not feel heart racing or skipping.  She takes her medications as directed.    Past Medical History:   Diagnosis Date    Allergic rhinitis     Atherosclerosis of aorta     Severe plaques in the descending aorta and aortic arch by ROSINA on 10/4/12    Atrial fibrillation     Post-op after CABG in 12/11     Atrial flutter     s/p cavotricuspid isthmus ablation on 11/8/12 by Dr. Beto Lea at [facility].      Bifascicular bundle branch block     RBBB and left posterior hemiblock     Carotid artery plaque     CTA of head and neck in 9/11 showed moderate calcification at the right carotid bulb causing less than 50% narrowing     Coronary artery disease     s/p CABG     Depression with anxiety     History of atrial flutter     s/p cavotricuspid isthmus ablation on 11/8/12 by Dr Lea at [facility]      Hypertension     Mitral valve insufficiency     Moderate residual MR through the repair by ROSINA on 10/4/12. Mild MR by echo on 2/6/14 and 6/30/16.    MR (mitral regurgitation)     PAT (paroxysmal atrial tachycardia)     Ectopic right atrial tachycardia, s/p ablation by Dr. Lea at [facility] on 11/27/12.     Statin intolerance     Severe myalgias with Crestor, Lipitor, and pravastatin     Transient cerebral ischemia     Two TIA's in 2011          Past Surgical History:   Procedure Laterality Date    CARPAL TUNNEL RELEASE      Neurplasty decompression Median Nerve at Carpal Tunnel     CORONARY ARTERY BYPASS GRAFT      3 Vessel CABG, MV repair, TV repair, and PFO closure on 12/22/2011 by Dr. Saini at [facility]. LIMA to the LAD, SVG-D2, and SVG-OM2.    MITRAL VALVE REPAIR/REPLACEMENT      PATENT FORAMEN OVALE CLOSURE      TONSILLECTOMY      TOTAL ABDOMINAL HYSTERECTOMY      2000     TRICUSPID VALVE SURGERY         Current Outpatient Medications on File Prior to Visit   Medication Sig Dispense Refill    aspirin 325 MG tablet Take 1 tablet by  "mouth Daily.      Cyanocobalamin (VITAMIN B12 PO) Take  by mouth Daily.      Diclofenac Sodium (VOLTAREN) 1 % gel gel       Flaxseed, Linseed, (FLAXSEED OIL PO) Take  by mouth Daily.      metoprolol tartrate (LOPRESSOR) 50 MG tablet Take 1 tablet by mouth 2 (Two) Times a Day. 180 tablet 2    Omega 3 1000 MG capsule Take 1 capsule by mouth Daily.      vitamin B-6 (PYRIDOXINE) 50 MG tablet Take 1 tablet by mouth Daily.       No current facility-administered medications on file prior to visit.       Social History     Socioeconomic History    Marital status:     Number of children: 3   Tobacco Use    Smoking status: Former     Types: Cigarettes     Start date:      Quit date:      Years since quittin.2     Passive exposure: Never    Smokeless tobacco: Never    Tobacco comments:     Caffeine: 2 coffee daily   Vaping Use    Vaping status: Never Used   Substance and Sexual Activity    Alcohol use: Yes     Comment: Rarely     Drug use: No    Sexual activity: Defer             Procedures    ECG 12 Lead    Date/Time: 4/3/2024 11:36 AM  Performed by: Shanice Aquino MD    Authorized by: Shanice Aquino MD  Comparison: compared with previous ECG   Similar to previous ECG  Rhythm: sinus rhythm  Conduction: right bundle branch block and left posterior fascicular block    Clinical impression: abnormal EKG              Objective:      Vitals:    24 1127   BP: 128/80   Pulse: 77   Weight: 62.6 kg (138 lb)   Height: 167.6 cm (66\")     Body mass index is 22.27 kg/m².    Vitals reviewed.   Constitutional:       General: Not in acute distress.     Appearance: Not diaphoretic.   Neck:      Vascular: No carotid bruit or JVD.   Pulmonary:      Effort: Pulmonary effort is normal.      Breath sounds: Normal breath sounds.   Cardiovascular:      Normal rate. Regular rhythm.      Murmurs: There is no murmur.      No gallop.  No rub.   Pulses:     Intact distal pulses.      Carotid: 2+ bilaterally.     " Radial: 2+ bilaterally.     Dorsalis pedis: 2+ bilaterally.     Posterior tibial: 2+ bilaterally.  Edema:     Peripheral edema absent.   Neurological:      Cranial Nerves: No cranial nerve deficit.         Lab Review:       Assessment:      Diagnosis Plan   1. Coronary artery disease involving native coronary artery of native heart without angina pectoris        2. S/P mitral valve repair        3. Hx of tricuspid valve repair        4. Goiter  US thyroid        CAD, s/p CABG in 2011  MV and TV repairs in 2011  PFO closure in 2011  Atrial flutter and atrial tachycardia ablations in 2012  Hyperlipidemia, on atorvastatin  History of TIA.  Carotid artery disease.     Plan:       See APRN in 1 year, so the ultrasound thyroid at [facility] for goiter noted on CT.  No medication changes, no other testing at this time.

## 2024-04-17 ENCOUNTER — HOSPITAL ENCOUNTER (OUTPATIENT)
Dept: ULTRASOUND IMAGING | Facility: HOSPITAL | Age: 84
Discharge: HOME | End: 2024-04-17
Admitting: INTERNAL MEDICINE
Payer: MEDICARE

## 2024-04-17 DIAGNOSIS — E04.9 GOITER: ICD-10-CM

## 2024-04-17 PROCEDURE — 76536 US EXAM OF HEAD AND NECK: CPT

## 2024-04-19 ENCOUNTER — TELEPHONE (OUTPATIENT)
Dept: CARDIOLOGY | Facility: CLINIC | Age: 84
End: 2024-04-19
Payer: MEDICARE

## 2024-04-19 NOTE — TELEPHONE ENCOUNTER
I called and spoke with daughter, Shabana, as allowed by verbal FOSTER.  She verbalizes understanding and agrees with plan and will pass the information along to the patient.  I will fax report to Dr. Elizabeth.    Yesenia Myers RN  [facility] Triage  04/19/24 10:42 EDT

## 2024-04-19 NOTE — TELEPHONE ENCOUNTER
Please call and let the patient know that the ultrasound of the thyroid is normal-please send result to her PCP.